# Patient Record
Sex: MALE | Race: WHITE | NOT HISPANIC OR LATINO
[De-identification: names, ages, dates, MRNs, and addresses within clinical notes are randomized per-mention and may not be internally consistent; named-entity substitution may affect disease eponyms.]

---

## 2017-01-08 ENCOUNTER — PRIOR ORIGINAL RECORDS (OUTPATIENT)
Dept: OTHER | Age: 57
End: 2017-01-08

## 2017-01-08 ENCOUNTER — APPOINTMENT (OUTPATIENT)
Dept: GENERAL RADIOLOGY | Facility: HOSPITAL | Age: 57
End: 2017-01-08
Attending: EMERGENCY MEDICINE
Payer: COMMERCIAL

## 2017-01-08 ENCOUNTER — APPOINTMENT (OUTPATIENT)
Dept: CT IMAGING | Facility: HOSPITAL | Age: 57
End: 2017-01-08
Attending: EMERGENCY MEDICINE
Payer: COMMERCIAL

## 2017-01-08 ENCOUNTER — HOSPITAL ENCOUNTER (EMERGENCY)
Facility: HOSPITAL | Age: 57
Discharge: HOME | End: 2017-01-08
Attending: EMERGENCY MEDICINE
Payer: COMMERCIAL

## 2017-01-08 VITALS
TEMPERATURE: 97 F | OXYGEN SATURATION: 100 % | SYSTOLIC BLOOD PRESSURE: 124 MMHG | WEIGHT: 281 LBS | BODY MASS INDEX: 39.34 KG/M2 | HEART RATE: 72 BPM | DIASTOLIC BLOOD PRESSURE: 80 MMHG | HEIGHT: 71 IN | RESPIRATION RATE: 18 BRPM

## 2017-01-08 DIAGNOSIS — S22.42XA CLOSED FRACTURE OF MULTIPLE RIBS OF LEFT SIDE, INITIAL ENCOUNTER: Primary | ICD-10-CM

## 2017-01-08 LAB
ALBUMIN SERPL-MCNC: 3.8 G/DL (ref 3.5–4.8)
ALP LIVER SERPL-CCNC: 95 U/L (ref 45–117)
ALT SERPL-CCNC: 34 U/L (ref 17–63)
APTT PPP: 36.4 SECONDS (ref 25–34)
AST SERPL-CCNC: 29 U/L (ref 15–41)
BASOPHILS # BLD AUTO: 0.03 X10(3) UL (ref 0–0.1)
BASOPHILS NFR BLD AUTO: 0.5 %
BILIRUB SERPL-MCNC: 1.1 MG/DL (ref 0.1–2)
BUN BLD-MCNC: 15 MG/DL (ref 8–20)
CALCIUM BLD-MCNC: 8.4 MG/DL (ref 8.3–10.3)
CHLORIDE: 104 MMOL/L (ref 101–111)
CO2: 25 MMOL/L (ref 22–32)
CREAT BLD-MCNC: 0.98 MG/DL (ref 0.7–1.3)
EOSINOPHIL # BLD AUTO: 0.18 X10(3) UL (ref 0–0.3)
EOSINOPHIL NFR BLD AUTO: 2.7 %
ERYTHROCYTE [DISTWIDTH] IN BLOOD BY AUTOMATED COUNT: 11.7 % (ref 11.5–16)
GLUCOSE BLD-MCNC: 202 MG/DL (ref 70–99)
HCT VFR BLD AUTO: 40.9 % (ref 37–53)
HGB BLD-MCNC: 14.3 G/DL (ref 13–17)
IMMATURE GRANULOCYTE COUNT: 0.02 X10(3) UL (ref 0–1)
IMMATURE GRANULOCYTE RATIO %: 0.3 %
INR BLD: 2.03 (ref 0.89–1.12)
LIPASE: 70 U/L (ref 73–393)
LYMPHOCYTES # BLD AUTO: 1.44 X10(3) UL (ref 0.9–4)
LYMPHOCYTES NFR BLD AUTO: 21.6 %
M PROTEIN MFR SERPL ELPH: 7 G/DL (ref 6.1–8.3)
MCH RBC QN AUTO: 31.7 PG (ref 27–33.2)
MCHC RBC AUTO-ENTMCNC: 35 G/DL (ref 31–37)
MCV RBC AUTO: 90.7 FL (ref 80–99)
MONOCYTES # BLD AUTO: 0.59 X10(3) UL (ref 0.1–0.6)
MONOCYTES NFR BLD AUTO: 8.9 %
NEUTROPHIL ABS PRELIM: 4.4 X10 (3) UL (ref 1.3–6.7)
NEUTROPHILS # BLD AUTO: 4.4 X10(3) UL (ref 1.3–6.7)
NEUTROPHILS NFR BLD AUTO: 66 %
PLATELET # BLD AUTO: 218 10(3)UL (ref 150–450)
POTASSIUM SERPL-SCNC: 3.6 MMOL/L (ref 3.6–5.1)
PSA SERPL DL<=0.01 NG/ML-MCNC: 23.7 SECONDS (ref 12.3–14.8)
RBC # BLD AUTO: 4.51 X10(6)UL (ref 4.3–5.7)
RED CELL DISTRIBUTION WIDTH-SD: 38.3 FL (ref 35.1–46.3)
SODIUM SERPL-SCNC: 136 MMOL/L (ref 136–144)
WBC # BLD AUTO: 6.7 X10(3) UL (ref 4–13)

## 2017-01-08 PROCEDURE — 74176 CT ABD & PELVIS W/O CONTRAST: CPT

## 2017-01-08 PROCEDURE — 36415 COLL VENOUS BLD VENIPUNCTURE: CPT

## 2017-01-08 PROCEDURE — 99284 EMERGENCY DEPT VISIT MOD MDM: CPT

## 2017-01-08 PROCEDURE — 85730 THROMBOPLASTIN TIME PARTIAL: CPT | Performed by: EMERGENCY MEDICINE

## 2017-01-08 PROCEDURE — 71101 X-RAY EXAM UNILAT RIBS/CHEST: CPT

## 2017-01-08 PROCEDURE — 85610 PROTHROMBIN TIME: CPT | Performed by: EMERGENCY MEDICINE

## 2017-01-08 PROCEDURE — 85025 COMPLETE CBC W/AUTO DIFF WBC: CPT | Performed by: EMERGENCY MEDICINE

## 2017-01-08 PROCEDURE — 80053 COMPREHEN METABOLIC PANEL: CPT | Performed by: EMERGENCY MEDICINE

## 2017-01-08 PROCEDURE — 83690 ASSAY OF LIPASE: CPT | Performed by: EMERGENCY MEDICINE

## 2017-01-08 RX ORDER — DIAZEPAM 5 MG/1
5 TABLET ORAL 3 TIMES DAILY PRN
Qty: 20 TABLET | Refills: 0 | Status: SHIPPED | OUTPATIENT
Start: 2017-01-08 | End: 2017-01-18

## 2017-01-08 RX ORDER — HYDROCODONE BITARTRATE AND ACETAMINOPHEN 5; 325 MG/1; MG/1
1-2 TABLET ORAL EVERY 4 HOURS PRN
Qty: 20 TABLET | Refills: 0 | Status: SHIPPED | OUTPATIENT
Start: 2017-01-08 | End: 2017-03-14 | Stop reason: ALTCHOICE

## 2017-01-08 NOTE — ED PROVIDER NOTES
Patient Seen in: [facility] Emergency Department    History   Patient presents with:  Trauma (cardiovascular, musculoskeletal)    Stated Complaint: RIB PAIN    HPI    63-year-old male presents to the emergency department with complaints to pain to t Attack Maternal Grandfather    • Pulmonary Disease Maternal Grandmother      pneumonia   • Cancer Mother      uterine   • Heart Disease Paternal Grandfather    • Heart Disease Paternal Grandmother          Smoking Status: Never Smoker tenderness. Lymphadenopathy:     He has no cervical adenopathy. Neurological: He is alert and oriented to person, place, and time. No cranial nerve deficit. He exhibits normal muscle tone. Skin: Skin is warm and dry.    Psychiatric: He has a normal mo exam.  We discussed care and treatment of fracture is being on Coumadin.   We also discussed that taking Norco and Valium at the same time can be very dangerous that I recommended that he separate them by at least an hour or 2 so that he does not become ove

## 2017-01-08 NOTE — ED INITIAL ASSESSMENT (HPI)
Pt fell while playing hockey yesterday. Pt fell onto chest.  No LOC. Pt completed the game. Pt woke this morning with left rib pain. Pt denies taking any pain medications this morning.

## 2017-01-11 ENCOUNTER — TELEPHONE (OUTPATIENT)
Dept: FAMILY MEDICINE CLINIC | Facility: CLINIC | Age: 57
End: 2017-01-11

## 2017-01-11 NOTE — TELEPHONE ENCOUNTER
Closed fracture of multiple ribs of left side, initial encounter  (primary encounter diagnosis) c/o pain with movement not DB doing well made appointment for 1/19/2017 with Dr Mis Mesa

## 2017-01-11 NOTE — TELEPHONE ENCOUNTER
Progress Notes by Favian Lilly PA-C at 1/8/2017 11:21 AM        Author: Favian Lilly PA-C Author Type: Physician Assistant Filed: 1/8/2017  1:16 PM       Note Status: Signed Cosign: Cosign Not Required Note Time: 1/8/2017 11:21 AM       :

## 2017-01-18 ENCOUNTER — PRIOR ORIGINAL RECORDS (OUTPATIENT)
Dept: OTHER | Age: 57
End: 2017-01-18

## 2017-01-19 ENCOUNTER — OFFICE VISIT (OUTPATIENT)
Dept: FAMILY MEDICINE CLINIC | Facility: CLINIC | Age: 57
End: 2017-01-19

## 2017-01-19 VITALS
SYSTOLIC BLOOD PRESSURE: 110 MMHG | HEIGHT: 71 IN | HEART RATE: 60 BPM | WEIGHT: 280.38 LBS | DIASTOLIC BLOOD PRESSURE: 60 MMHG | TEMPERATURE: 98 F | BODY MASS INDEX: 39.25 KG/M2 | RESPIRATION RATE: 16 BRPM

## 2017-01-19 DIAGNOSIS — S22.42XA CLOSED FRACTURE OF MULTIPLE RIBS OF LEFT SIDE, INITIAL ENCOUNTER: ICD-10-CM

## 2017-01-19 DIAGNOSIS — J01.00 ACUTE NON-RECURRENT MAXILLARY SINUSITIS: Primary | ICD-10-CM

## 2017-01-19 PROCEDURE — 99213 OFFICE O/P EST LOW 20 MIN: CPT | Performed by: FAMILY MEDICINE

## 2017-01-19 RX ORDER — AMOXICILLIN AND CLAVULANATE POTASSIUM 875; 125 MG/1; MG/1
1 TABLET, FILM COATED ORAL 2 TIMES DAILY
Qty: 20 TABLET | Refills: 0 | Status: SHIPPED | OUTPATIENT
Start: 2017-01-19 | End: 2017-01-29

## 2017-01-19 RX ORDER — CODEINE PHOSPHATE AND GUAIFENESIN 10; 100 MG/5ML; MG/5ML
5 SOLUTION ORAL EVERY 6 HOURS PRN
Qty: 120 ML | Refills: 0 | Status: SHIPPED | OUTPATIENT
Start: 2017-01-19 | End: 2017-01-29

## 2017-01-19 NOTE — PROGRESS NOTES
Patient presents with:  ER F/U: seen on 1/8/17 Dx with Rib Frx on left side #6,#7  Cough: productive with green sputum- has been around for a while now     HPI:   Inna Ariza is a 64year old male who presents to the office for productive cough.   Been thehealing. PRN meds. - Amoxicillin-Pot Clavulanate 875-125 MG Oral Tab; Take 1 tablet by mouth 2 (two) times daily. Dispense: 20 tablet;  Refill: 0          Eric Machado M.D.   EMG 3  01/19/2017

## 2017-01-31 ENCOUNTER — OFFICE VISIT (OUTPATIENT)
Dept: FAMILY MEDICINE CLINIC | Facility: CLINIC | Age: 57
End: 2017-01-31

## 2017-01-31 VITALS
SYSTOLIC BLOOD PRESSURE: 110 MMHG | WEIGHT: 281.19 LBS | HEIGHT: 70.8 IN | DIASTOLIC BLOOD PRESSURE: 64 MMHG | BODY MASS INDEX: 39.37 KG/M2 | RESPIRATION RATE: 14 BRPM | HEART RATE: 64 BPM

## 2017-01-31 DIAGNOSIS — R09.82 PND (POST-NASAL DRIP): Primary | ICD-10-CM

## 2017-01-31 PROCEDURE — 99213 OFFICE O/P EST LOW 20 MIN: CPT | Performed by: FAMILY MEDICINE

## 2017-01-31 NOTE — PROGRESS NOTES
Patient presents with:  Cough: finished Abx - still coughing - still productive-clear- feels alot of sinus mucus also being produced     HPI:   Brittany Abraham is a 64year old male who presents to the office for continued symptoms.   Mother in law recently

## 2017-03-14 ENCOUNTER — OFFICE VISIT (OUTPATIENT)
Dept: FAMILY MEDICINE CLINIC | Facility: CLINIC | Age: 57
End: 2017-03-14

## 2017-03-14 VITALS
RESPIRATION RATE: 16 BRPM | SYSTOLIC BLOOD PRESSURE: 114 MMHG | TEMPERATURE: 98 F | HEIGHT: 70.75 IN | WEIGHT: 277 LBS | HEART RATE: 70 BPM | BODY MASS INDEX: 38.78 KG/M2 | DIASTOLIC BLOOD PRESSURE: 70 MMHG

## 2017-03-14 DIAGNOSIS — J45.20 MILD INTERMITTENT ASTHMA WITHOUT COMPLICATION: ICD-10-CM

## 2017-03-14 DIAGNOSIS — I48.20 CHRONIC ATRIAL FIBRILLATION (HCC): ICD-10-CM

## 2017-03-14 DIAGNOSIS — E11.65 UNCONTROLLED TYPE 2 DIABETES MELLITUS WITH HYPERGLYCEMIA, WITHOUT LONG-TERM CURRENT USE OF INSULIN (HCC): ICD-10-CM

## 2017-03-14 DIAGNOSIS — G47.33 OSA ON CPAP: ICD-10-CM

## 2017-03-14 DIAGNOSIS — Z99.89 OSA ON CPAP: ICD-10-CM

## 2017-03-14 DIAGNOSIS — R09.82 POST-NASAL DRIP: ICD-10-CM

## 2017-03-14 DIAGNOSIS — Z12.11 COLON CANCER SCREENING: ICD-10-CM

## 2017-03-14 DIAGNOSIS — Z12.5 PROSTATE CANCER SCREENING: ICD-10-CM

## 2017-03-14 DIAGNOSIS — Z00.00 ANNUAL PHYSICAL EXAM: Primary | ICD-10-CM

## 2017-03-14 DIAGNOSIS — I10 ESSENTIAL HYPERTENSION: ICD-10-CM

## 2017-03-14 DIAGNOSIS — E78.5 HYPERLIPIDEMIA LDL GOAL <100: ICD-10-CM

## 2017-03-14 PROCEDURE — 99396 PREV VISIT EST AGE 40-64: CPT | Performed by: FAMILY MEDICINE

## 2017-03-14 NOTE — PROGRESS NOTES
Patient presents with:  Physical     HPI:   Yaritza Hoffman is a 64year old male with PMH a-fib, DM2, HTN, HLD, ANTOINETTE, asthma who presents for a complete physical exam.     Last colonoscopy:  Never had.     Last PSA:  Urination 4 times a night, new the last 07:43 AM      No results found for: PSA        Current Outpatient Prescriptions:  MetFORMIN HCl  MG Oral Tablet 24 Hr Take 1 tablet (500 mg total) by mouth 2 (two) times daily with meals.  Disp: 60 tablet Rfl: 11   Warfarin Sodium (COUMADIN) 5 MG Oral General appearance: alert, appears stated age and cooperative. Obese. Eyes: conjunctivae/corneas clear. PERRL, EOM's intact. Ears: normal TM's and external ear canals both ears  Nose: Nares normal. Septum midline.  Mucosa normal. No drainage or sinu Essential hypertension  BP at goal.  No changes to meds. - Comp Metabolic Panel [E]; Future    9. Hyperlipidemia LDL goal <100  Lipids not at goal last check. Due for check. - Comp Metabolic Panel [E]; Future  - Lipid Panel [E]; Future    10.  Prostate c

## 2017-03-15 ENCOUNTER — TELEPHONE (OUTPATIENT)
Dept: FAMILY MEDICINE CLINIC | Facility: CLINIC | Age: 57
End: 2017-03-15

## 2017-03-15 DIAGNOSIS — J30.2 SEASONAL ALLERGIC RHINITIS, UNSPECIFIED ALLERGIC RHINITIS TRIGGER: Primary | ICD-10-CM

## 2017-03-15 RX ORDER — AZELASTINE 1 MG/ML
1 SPRAY, METERED NASAL 2 TIMES DAILY
Qty: 1 BOTTLE | Refills: 2 | Status: SHIPPED | OUTPATIENT
Start: 2017-03-15 | End: 2020-09-09

## 2017-03-15 RX ORDER — FLUTICASONE PROPIONATE 50 MCG
2 SPRAY, SUSPENSION (ML) NASAL DAILY
Qty: 1 BOTTLE | Refills: 2 | Status: SHIPPED | OUTPATIENT
Start: 2017-03-15 | End: 2018-03-10

## 2017-03-15 NOTE — TELEPHONE ENCOUNTER
Veri mist has been discontinued, can you please call in another medication? He suggested flonase. He was seen by Dr. Madi West 3/14/17.

## 2017-03-15 NOTE — TELEPHONE ENCOUNTER
Babak notified that [facility] has ordered 2 nasal sprays to take the place of Bienvenido gibbs understanding.

## 2017-03-15 NOTE — TELEPHONE ENCOUNTER
Please advise as to what you would like to prescribe    Routed to [facility] [place]

## 2017-04-17 ENCOUNTER — HOSPITAL ENCOUNTER (OUTPATIENT)
Dept: LAB | Facility: HOSPITAL | Age: 57
Discharge: HOME | End: 2017-04-17
Attending: INTERNAL MEDICINE
Payer: COMMERCIAL

## 2017-04-17 DIAGNOSIS — I48.91 ATRIAL FIBRILLATION (HCC): ICD-10-CM

## 2017-04-17 PROCEDURE — 85610 PROTHROMBIN TIME: CPT

## 2017-04-26 ENCOUNTER — HOSPITAL ENCOUNTER (OUTPATIENT)
Dept: LAB | Facility: HOSPITAL | Age: 57
Discharge: HOME | End: 2017-04-26
Attending: INTERNAL MEDICINE
Payer: COMMERCIAL

## 2017-04-26 ENCOUNTER — PRIOR ORIGINAL RECORDS (OUTPATIENT)
Dept: OTHER | Age: 57
End: 2017-04-26

## 2017-04-26 DIAGNOSIS — I48.91 ATRIAL FIBRILLATION (HCC): ICD-10-CM

## 2017-04-26 LAB
ALBUMIN: 3.8 G/DL
BILIRUBIN TOTAL: 1.1 MG/DL
BUN: 15 MG/DL
CALCIUM: 8.4 MG/DL
CHLORIDE: 104 MEQ/L
CREATININE, SERUM: 0.98 MG/DL
GLUCOSE: 202 MG/DL
HEMATOCRIT: 40.9 %
HEMOGLOBIN: 14.3 G/DL
PLATELETS: 218 K/UL
POTASSIUM, SERUM: 3.6 MEQ/L
PROTEIN, TOTAL: 7 G/DL
RED BLOOD COUNT: 4.51 X 10-6/U
SGOT (AST): 29 IU/L
SGPT (ALT): 34 IU/L
SODIUM: 136 MEQ/L
WHITE BLOOD COUNT: 6.7 X 10-3/U

## 2017-04-26 PROCEDURE — 85610 PROTHROMBIN TIME: CPT

## 2017-06-13 ENCOUNTER — HOSPITAL ENCOUNTER (OUTPATIENT)
Dept: LAB | Facility: HOSPITAL | Age: 57
Discharge: HOME | End: 2017-06-13
Attending: INTERNAL MEDICINE
Payer: COMMERCIAL

## 2017-06-13 ENCOUNTER — PRIOR ORIGINAL RECORDS (OUTPATIENT)
Dept: OTHER | Age: 57
End: 2017-06-13

## 2017-06-13 DIAGNOSIS — I48.91 ATRIAL FIBRILLATION (HCC): ICD-10-CM

## 2017-06-13 PROCEDURE — 80061 LIPID PANEL: CPT | Performed by: INTERNAL MEDICINE

## 2017-06-13 PROCEDURE — 36415 COLL VENOUS BLD VENIPUNCTURE: CPT | Performed by: INTERNAL MEDICINE

## 2017-06-13 PROCEDURE — 83036 HEMOGLOBIN GLYCOSYLATED A1C: CPT | Performed by: INTERNAL MEDICINE

## 2017-06-13 PROCEDURE — 85610 PROTHROMBIN TIME: CPT

## 2017-06-13 PROCEDURE — 80053 COMPREHEN METABOLIC PANEL: CPT | Performed by: INTERNAL MEDICINE

## 2017-07-21 DIAGNOSIS — E11.65 DIABETES MELLITUS TYPE II, UNCONTROLLED (HCC): ICD-10-CM

## 2017-07-24 DIAGNOSIS — E11.65 DIABETES MELLITUS TYPE II, UNCONTROLLED (HCC): ICD-10-CM

## 2017-07-24 RX ORDER — METFORMIN HYDROCHLORIDE 500 MG/1
TABLET, EXTENDED RELEASE ORAL
Qty: 60 TABLET | Refills: 5 | Status: SHIPPED | OUTPATIENT
Start: 2017-07-24 | End: 2018-03-14

## 2017-07-24 RX ORDER — METFORMIN HYDROCHLORIDE 500 MG/1
TABLET, EXTENDED RELEASE ORAL
Qty: 60 TABLET | Refills: 0 | Status: SHIPPED | OUTPATIENT
Start: 2017-07-24 | End: 2018-03-14

## 2017-07-24 NOTE — TELEPHONE ENCOUNTER
Incoming request for Metformin. LOV 3/14/2017 and last labs done 6/13/2017. No future appointments. A1C on 6/13/2017 ws 9.8. Medication did not pass protocol. Will you authorize?   Routed to Dr. Gretchen Cox

## 2017-08-19 ENCOUNTER — HOSPITAL ENCOUNTER (OUTPATIENT)
Dept: LAB | Facility: HOSPITAL | Age: 57
Discharge: HOME | End: 2017-08-19
Attending: INTERNAL MEDICINE
Payer: COMMERCIAL

## 2017-08-19 DIAGNOSIS — I48.91 ATRIAL FIBRILLATION (HCC): ICD-10-CM

## 2017-08-19 LAB — POC INR: 2.7 (ref 0.8–1.3)

## 2017-08-19 PROCEDURE — 85610 PROTHROMBIN TIME: CPT

## 2017-08-24 DIAGNOSIS — E11.65 DIABETES MELLITUS TYPE II, UNCONTROLLED (HCC): ICD-10-CM

## 2017-08-25 RX ORDER — METFORMIN HYDROCHLORIDE 500 MG/1
TABLET, EXTENDED RELEASE ORAL
Qty: 60 TABLET | Refills: 5 | Status: SHIPPED | OUTPATIENT
Start: 2017-08-25 | End: 2018-02-14

## 2017-09-08 ENCOUNTER — PRIOR ORIGINAL RECORDS (OUTPATIENT)
Dept: OTHER | Age: 57
End: 2017-09-08

## 2017-11-30 ENCOUNTER — HOSPITAL ENCOUNTER (OUTPATIENT)
Dept: LAB | Facility: HOSPITAL | Age: 57
Discharge: HOME | End: 2017-11-30
Attending: INTERNAL MEDICINE
Payer: COMMERCIAL

## 2017-11-30 PROCEDURE — 85610 PROTHROMBIN TIME: CPT

## 2017-12-13 ENCOUNTER — PRIOR ORIGINAL RECORDS (OUTPATIENT)
Dept: OTHER | Age: 57
End: 2017-12-13

## 2017-12-13 LAB
ALKALINE PHOSPHATATE(ALK PHOS): 86 IU/L
BILIRUBIN TOTAL: 0.8 MG/DL
BUN: 15 MG/DL
CALCIUM: 8.4 MG/DL
CHLORIDE: 105 MEQ/L
CHOLESTEROL, TOTAL: 213 MG/DL
CREATININE, SERUM: 0.87 MG/DL
GLUCOSE: 229 MG/DL
HDL CHOLESTEROL: 32 MG/DL
HEMOGLOBIN A1C: 9.8 %
LDL CHOLESTEROL: 115 MG/DL
POTASSIUM, SERUM: 4.3 MEQ/L
PROTEIN, TOTAL: 7.1 G/DL
SGOT (AST): 24 IU/L
SGPT (ALT): 31 IU/L
SODIUM: 138 MEQ/L
TRIGLYCERIDES: 331 MG/DL

## 2018-02-14 ENCOUNTER — TELEPHONE (OUTPATIENT)
Dept: FAMILY MEDICINE CLINIC | Facility: CLINIC | Age: 58
End: 2018-02-14

## 2018-02-14 DIAGNOSIS — Z12.5 PROSTATE CANCER SCREENING: ICD-10-CM

## 2018-02-14 DIAGNOSIS — I10 ESSENTIAL HYPERTENSION: ICD-10-CM

## 2018-02-14 DIAGNOSIS — E78.5 HYPERLIPIDEMIA LDL GOAL <100: ICD-10-CM

## 2018-02-14 DIAGNOSIS — E11.65 DIABETES MELLITUS TYPE II, UNCONTROLLED (HCC): Primary | ICD-10-CM

## 2018-02-15 RX ORDER — METFORMIN HYDROCHLORIDE 500 MG/1
TABLET, EXTENDED RELEASE ORAL
Qty: 60 TABLET | Refills: 0 | Status: SHIPPED | OUTPATIENT
Start: 2018-02-15 | End: 2018-03-14

## 2018-02-15 NOTE — TELEPHONE ENCOUNTER
Please call Pt and assist with scheduling appt for Physical/Diabetes. LOV 03/14/17. Routed to [facility].

## 2018-02-16 ENCOUNTER — TELEPHONE (OUTPATIENT)
Dept: FAMILY MEDICINE CLINIC | Facility: CLINIC | Age: 58
End: 2018-02-16

## 2018-02-16 NOTE — TELEPHONE ENCOUNTER
Spoke with patient and scheduled his physical with Dr. Latonia Greer for 3/14/18. Please call labs into [facility]. Thank you.

## 2018-02-16 NOTE — TELEPHONE ENCOUNTER
Pt coning for Physical 3/14/18. Is there any labs you want Pt to complete prior to appt  ? Routed to Dr Rosas Kelley.

## 2018-03-13 ENCOUNTER — HOSPITAL ENCOUNTER (OUTPATIENT)
Dept: LAB | Facility: HOSPITAL | Age: 58
Discharge: HOME | End: 2018-03-13
Attending: FAMILY MEDICINE
Payer: COMMERCIAL

## 2018-03-13 ENCOUNTER — HOSPITAL ENCOUNTER (OUTPATIENT)
Dept: LAB | Facility: HOSPITAL | Age: 58
Discharge: HOME | End: 2018-03-13
Attending: INTERNAL MEDICINE
Payer: COMMERCIAL

## 2018-03-13 DIAGNOSIS — Z12.5 PROSTATE CANCER SCREENING: ICD-10-CM

## 2018-03-13 DIAGNOSIS — I10 ESSENTIAL HYPERTENSION: ICD-10-CM

## 2018-03-13 DIAGNOSIS — I48.91 ATRIAL FIBRILLATION (HCC): ICD-10-CM

## 2018-03-13 DIAGNOSIS — E11.65 DIABETES MELLITUS TYPE II, UNCONTROLLED (HCC): ICD-10-CM

## 2018-03-13 DIAGNOSIS — E78.5 HYPERLIPIDEMIA LDL GOAL <100: ICD-10-CM

## 2018-03-13 LAB
ALBUMIN SERPL-MCNC: 3.5 G/DL (ref 3.5–4.8)
ALP LIVER SERPL-CCNC: 87 U/L (ref 45–117)
ALT SERPL-CCNC: 25 U/L (ref 17–63)
AST SERPL-CCNC: 19 U/L (ref 15–41)
BILIRUB SERPL-MCNC: 0.7 MG/DL (ref 0.1–2)
BUN BLD-MCNC: 11 MG/DL (ref 8–20)
CALCIUM BLD-MCNC: 8.4 MG/DL (ref 8.3–10.3)
CHLORIDE: 107 MMOL/L (ref 101–111)
CHOLEST SMN-MCNC: 167 MG/DL (ref ?–200)
CO2: 26 MMOL/L (ref 22–32)
COMPLEXED PSA SERPL-MCNC: 1.56 NG/ML (ref 0.01–4)
CREAT BLD-MCNC: 0.8 MG/DL (ref 0.7–1.3)
CREAT UR-SCNC: 266 MG/DL
EST. AVERAGE GLUCOSE BLD GHB EST-MCNC: 252 MG/DL (ref 68–126)
GLUCOSE BLD-MCNC: 225 MG/DL (ref 70–99)
HBA1C MFR BLD HPLC: 10.4 % (ref ?–5.7)
HDLC SERPL-MCNC: 31 MG/DL (ref 45–?)
HDLC SERPL: 5.39 {RATIO} (ref ?–4.97)
LDLC SERPL CALC-MCNC: 81 MG/DL (ref ?–130)
M PROTEIN MFR SERPL ELPH: 7.1 G/DL (ref 6.1–8.3)
MICROALBUMIN UR-MCNC: 3.07 MG/DL
MICROALBUMIN/CREAT 24H UR-RTO: 11.5 UG/MG (ref ?–30)
NONHDLC SERPL-MCNC: 136 MG/DL (ref ?–130)
POC INR: 2.6 (ref 0.8–1.3)
POTASSIUM SERPL-SCNC: 3.9 MMOL/L (ref 3.6–5.1)
SODIUM SERPL-SCNC: 141 MMOL/L (ref 136–144)
TRIGL SERPL-MCNC: 274 MG/DL (ref ?–150)
VLDLC SERPL CALC-MCNC: 55 MG/DL (ref 5–40)

## 2018-03-13 PROCEDURE — 85610 PROTHROMBIN TIME: CPT

## 2018-03-13 PROCEDURE — 83036 HEMOGLOBIN GLYCOSYLATED A1C: CPT

## 2018-03-13 PROCEDURE — 80061 LIPID PANEL: CPT

## 2018-03-13 PROCEDURE — 82570 ASSAY OF URINE CREATININE: CPT

## 2018-03-13 PROCEDURE — 36415 COLL VENOUS BLD VENIPUNCTURE: CPT

## 2018-03-13 PROCEDURE — 82043 UR ALBUMIN QUANTITATIVE: CPT

## 2018-03-13 PROCEDURE — 80053 COMPREHEN METABOLIC PANEL: CPT

## 2018-03-14 ENCOUNTER — OFFICE VISIT (OUTPATIENT)
Dept: FAMILY MEDICINE CLINIC | Facility: CLINIC | Age: 58
End: 2018-03-14

## 2018-03-14 VITALS
SYSTOLIC BLOOD PRESSURE: 128 MMHG | RESPIRATION RATE: 14 BRPM | DIASTOLIC BLOOD PRESSURE: 80 MMHG | BODY MASS INDEX: 38.27 KG/M2 | WEIGHT: 273.38 LBS | HEART RATE: 80 BPM | TEMPERATURE: 99 F | HEIGHT: 71 IN

## 2018-03-14 DIAGNOSIS — G47.33 OSA ON CPAP: ICD-10-CM

## 2018-03-14 DIAGNOSIS — Z00.00 ANNUAL PHYSICAL EXAM: Primary | ICD-10-CM

## 2018-03-14 DIAGNOSIS — E78.5 HYPERLIPIDEMIA LDL GOAL <100: ICD-10-CM

## 2018-03-14 DIAGNOSIS — I10 ESSENTIAL HYPERTENSION: ICD-10-CM

## 2018-03-14 DIAGNOSIS — J45.20 MILD INTERMITTENT ASTHMA WITHOUT COMPLICATION: ICD-10-CM

## 2018-03-14 DIAGNOSIS — E66.01 SEVERE OBESITY (BMI 35.0-39.9): ICD-10-CM

## 2018-03-14 DIAGNOSIS — Z99.89 OSA ON CPAP: ICD-10-CM

## 2018-03-14 DIAGNOSIS — I48.20 CHRONIC ATRIAL FIBRILLATION (HCC): ICD-10-CM

## 2018-03-14 DIAGNOSIS — Z12.11 COLON CANCER SCREENING: ICD-10-CM

## 2018-03-14 DIAGNOSIS — E11.65 UNCONTROLLED TYPE 2 DIABETES MELLITUS WITH HYPERGLYCEMIA, WITHOUT LONG-TERM CURRENT USE OF INSULIN (HCC): ICD-10-CM

## 2018-03-14 DIAGNOSIS — J01.00 ACUTE NON-RECURRENT MAXILLARY SINUSITIS: ICD-10-CM

## 2018-03-14 PROCEDURE — 99396 PREV VISIT EST AGE 40-64: CPT | Performed by: FAMILY MEDICINE

## 2018-03-14 RX ORDER — ALBUTEROL SULFATE 90 UG/1
2 AEROSOL, METERED RESPIRATORY (INHALATION) EVERY 4 HOURS PRN
Qty: 1 INHALER | Refills: 2 | Status: SHIPPED | OUTPATIENT
Start: 2018-03-14

## 2018-03-14 RX ORDER — METFORMIN HYDROCHLORIDE 500 MG/1
1000 TABLET, EXTENDED RELEASE ORAL 2 TIMES DAILY WITH MEALS
Qty: 120 TABLET | Refills: 5 | Status: SHIPPED | OUTPATIENT
Start: 2018-03-14 | End: 2018-12-15

## 2018-03-14 RX ORDER — AMOXICILLIN AND CLAVULANATE POTASSIUM 875; 125 MG/1; MG/1
1 TABLET, FILM COATED ORAL 2 TIMES DAILY
Qty: 14 TABLET | Refills: 0 | Status: SHIPPED | OUTPATIENT
Start: 2018-03-14 | End: 2018-03-21

## 2018-03-14 RX ORDER — GLIPIZIDE 10 MG/1
5 TABLET ORAL
Qty: 30 TABLET | Refills: 5 | Status: SHIPPED | OUTPATIENT
Start: 2018-03-14 | End: 2018-09-30

## 2018-03-14 NOTE — PROGRESS NOTES
Patient presents with:  Physical  URI: SINUS CONGESTION WITH GREEN MUCUS- FOR TEN DAYS     HPI:   Brittany Abraham is a 62year old male who presents for a complete physical exam.     Last colonoscopy:  Never had. Last PSA:  Normal PSA blood testing.    I (90 Base) MCG/ACT Inhalation Aero Soln Inhale 2 puffs into the lungs every 4 (four) hours as needed for Wheezing. Disp: 1 Inhaler Rfl: 2   MetFORMIN HCl  MG Oral Tablet 24 Hr Take 2 tablets (1,000 mg total) by mouth 2 (two) times daily with meals.  Bushra mAador in summer. Idle in winter. Diet: lower portion sizes. REVIEW OF SYSTEMS:     All systems reviewed, negative other than noted above.     EXAM:   /80   Pulse 80   Temp 99.1 °F (37.3 °C) (Oral)   Resp 14   Ht 71\"   Wt 273 lb 6.4 oz   BMI 38.13 k 1,000mg ER BID  Add glipizide 5mg BID  - MetFORMIN HCl  MG Oral Tablet 24 Hr; Take 2 tablets (1,000 mg total) by mouth 2 (two) times daily with meals. Dispense: 120 tablet; Refill: 5  - glipiZIDE 10 MG Oral Tab;  Take 0.5 tablets (5 mg total) by mout

## 2018-03-23 ENCOUNTER — TELEPHONE (OUTPATIENT)
Dept: FAMILY MEDICINE CLINIC | Facility: CLINIC | Age: 58
End: 2018-03-23

## 2018-03-23 RX ORDER — AMOXICILLIN AND CLAVULANATE POTASSIUM 875; 125 MG/1; MG/1
1 TABLET, FILM COATED ORAL 2 TIMES DAILY
Qty: 10 TABLET | Refills: 0 | Status: SHIPPED | OUTPATIENT
Start: 2018-03-23 | End: 2018-03-28

## 2018-03-23 NOTE — TELEPHONE ENCOUNTER
Pt states was feeling good, finished antibiotic 2 days ago. Wake with morning with slight cough, throat irritation and voice is hoarse.  Pt concerned about taking any OTC medication due to his health and meds  Please advise

## 2018-03-23 NOTE — TELEPHONE ENCOUNTER
Patient was seen 03/14/18 for sinus and congestion given a prescription for Amoxicillin which he finished 2 days ago, patient states he feels everything coming back again and not sure if he needs to be seen again or if he can get another prescription

## 2018-04-19 ENCOUNTER — TELEPHONE (OUTPATIENT)
Dept: FAMILY MEDICINE CLINIC | Facility: CLINIC | Age: 58
End: 2018-04-19

## 2018-06-06 ENCOUNTER — PRIOR ORIGINAL RECORDS (OUTPATIENT)
Dept: OTHER | Age: 58
End: 2018-06-06

## 2018-06-06 ENCOUNTER — HOSPITAL ENCOUNTER (OUTPATIENT)
Dept: LAB | Facility: HOSPITAL | Age: 58
Discharge: HOME | End: 2018-06-06
Attending: INTERNAL MEDICINE
Payer: COMMERCIAL

## 2018-06-06 DIAGNOSIS — I48.91 ATRIAL FIBRILLATION (HCC): ICD-10-CM

## 2018-06-06 PROCEDURE — 80053 COMPREHEN METABOLIC PANEL: CPT | Performed by: INTERNAL MEDICINE

## 2018-06-06 PROCEDURE — 36415 COLL VENOUS BLD VENIPUNCTURE: CPT | Performed by: INTERNAL MEDICINE

## 2018-06-06 PROCEDURE — 83036 HEMOGLOBIN GLYCOSYLATED A1C: CPT | Performed by: INTERNAL MEDICINE

## 2018-06-06 PROCEDURE — 85610 PROTHROMBIN TIME: CPT

## 2018-06-06 PROCEDURE — 80061 LIPID PANEL: CPT | Performed by: INTERNAL MEDICINE

## 2018-06-07 LAB
ALKALINE PHOSPHATATE(ALK PHOS): 71 IU/L
BILIRUBIN TOTAL: 0.8 MG/DL
BUN: 15 MG/DL
CALCIUM: 8.5 MG/DL
CHLORIDE: 108 MEQ/L
CHOLESTEROL, TOTAL: 183 MG/DL
CREATININE, SERUM: 0.93 MG/DL
GLUCOSE: 125 MG/DL
HDL CHOLESTEROL: 31 MG/DL
HEMOGLOBIN A1C: 7.7 %
LDL CHOLESTEROL: 96 MG/DL
POTASSIUM, SERUM: 4.3 MEQ/L
PROTEIN, TOTAL: 7.2 G/DL
SGOT (AST): 23 IU/L
SGPT (ALT): 34 IU/L
SODIUM: 141 MEQ/L
TRIGLYCERIDES: 282 MG/DL

## 2018-06-13 ENCOUNTER — PRIOR ORIGINAL RECORDS (OUTPATIENT)
Dept: OTHER | Age: 58
End: 2018-06-13

## 2018-06-13 ENCOUNTER — MYAURORA ACCOUNT LINK (OUTPATIENT)
Dept: OTHER | Age: 58
End: 2018-06-13

## 2018-07-11 ENCOUNTER — OFFICE VISIT (OUTPATIENT)
Dept: FAMILY MEDICINE CLINIC | Facility: CLINIC | Age: 58
End: 2018-07-11

## 2018-07-11 ENCOUNTER — PRIOR ORIGINAL RECORDS (OUTPATIENT)
Dept: OTHER | Age: 58
End: 2018-07-11

## 2018-07-11 VITALS
HEART RATE: 72 BPM | TEMPERATURE: 99 F | WEIGHT: 278.63 LBS | SYSTOLIC BLOOD PRESSURE: 110 MMHG | HEIGHT: 71.6 IN | BODY MASS INDEX: 38.15 KG/M2 | DIASTOLIC BLOOD PRESSURE: 70 MMHG | RESPIRATION RATE: 14 BRPM

## 2018-07-11 DIAGNOSIS — I48.20 CHRONIC ATRIAL FIBRILLATION (HCC): ICD-10-CM

## 2018-07-11 DIAGNOSIS — M25.552 LEFT HIP PAIN: ICD-10-CM

## 2018-07-11 DIAGNOSIS — J45.20 MILD INTERMITTENT ASTHMA WITHOUT COMPLICATION: ICD-10-CM

## 2018-07-11 DIAGNOSIS — E11.65 UNCONTROLLED TYPE 2 DIABETES MELLITUS WITH HYPERGLYCEMIA, WITHOUT LONG-TERM CURRENT USE OF INSULIN (HCC): Primary | ICD-10-CM

## 2018-07-11 PROCEDURE — 99214 OFFICE O/P EST MOD 30 MIN: CPT | Performed by: FAMILY MEDICINE

## 2018-07-11 RX ORDER — TRAMADOL HYDROCHLORIDE 50 MG/1
50 TABLET ORAL 2 TIMES DAILY PRN
Qty: 20 TABLET | Refills: 0 | Status: SHIPPED | OUTPATIENT
Start: 2018-07-11 | End: 2019-10-29

## 2018-07-11 NOTE — PROGRESS NOTES
Patient presents with:  Diabetes     HPI:   Heddie Peabody is a 62year old male who presents for a diabetic visit. Typical blood sugar levels are better. A1C 7.7. Much improved from the 10 he had 3 months prior.   Watching carbs more, increasing exer 9.6 oz   BMI 38.21 kg/m²   Wt Readings from Last 6 Encounters:  07/11/18 : 278 lb 9.6 oz  06/18/18 : 276 lb  03/14/18 : 273 lb 6.4 oz  03/14/17 : 277 lb  01/31/17 : 281 lb 3.2 oz  01/19/17 : 280 lb 6.4 oz      General: alert, well appearing, and in no dist

## 2018-08-15 ENCOUNTER — MYAURORA ACCOUNT LINK (OUTPATIENT)
Dept: OTHER | Age: 58
End: 2018-08-15

## 2018-08-15 ENCOUNTER — PRIOR ORIGINAL RECORDS (OUTPATIENT)
Dept: OTHER | Age: 58
End: 2018-08-15

## 2018-08-30 ENCOUNTER — HOSPITAL ENCOUNTER (OUTPATIENT)
Dept: CV DIAGNOSTICS | Facility: HOSPITAL | Age: 58
Discharge: HOME | End: 2018-08-30
Attending: INTERNAL MEDICINE

## 2018-08-30 ENCOUNTER — HOSPITAL ENCOUNTER (OUTPATIENT)
Dept: LAB | Facility: HOSPITAL | Age: 58
Discharge: HOME | End: 2018-08-30
Attending: INTERNAL MEDICINE
Payer: COMMERCIAL

## 2018-08-30 ENCOUNTER — MYAURORA ACCOUNT LINK (OUTPATIENT)
Dept: OTHER | Age: 58
End: 2018-08-30

## 2018-08-30 DIAGNOSIS — I48.20 ATRIAL FIBRILLATION, CHRONIC (HCC): ICD-10-CM

## 2018-08-30 LAB
INR BLD: 2.73 (ref 0.9–1.1)
PSA SERPL DL<=0.01 NG/ML-MCNC: 29.8 SECONDS (ref 12.4–14.7)

## 2018-08-30 PROCEDURE — 36415 COLL VENOUS BLD VENIPUNCTURE: CPT | Performed by: INTERNAL MEDICINE

## 2018-08-30 PROCEDURE — 85610 PROTHROMBIN TIME: CPT | Performed by: INTERNAL MEDICINE

## 2018-09-04 ENCOUNTER — PRIOR ORIGINAL RECORDS (OUTPATIENT)
Dept: OTHER | Age: 58
End: 2018-09-04

## 2018-09-30 DIAGNOSIS — E11.65 UNCONTROLLED TYPE 2 DIABETES MELLITUS WITH HYPERGLYCEMIA, WITHOUT LONG-TERM CURRENT USE OF INSULIN (HCC): ICD-10-CM

## 2018-10-04 RX ORDER — GLIPIZIDE 10 MG/1
TABLET ORAL
Qty: 30 TABLET | Refills: 5 | Status: SHIPPED | OUTPATIENT
Start: 2018-10-04 | End: 2019-03-14

## 2018-12-15 DIAGNOSIS — E11.65 UNCONTROLLED TYPE 2 DIABETES MELLITUS WITH HYPERGLYCEMIA, WITHOUT LONG-TERM CURRENT USE OF INSULIN (HCC): ICD-10-CM

## 2018-12-15 RX ORDER — METFORMIN HYDROCHLORIDE 500 MG/1
TABLET, EXTENDED RELEASE ORAL
Qty: 120 TABLET | Refills: 5 | Status: SHIPPED | OUTPATIENT
Start: 2018-12-15 | End: 2019-03-14

## 2018-12-28 ENCOUNTER — PATIENT MESSAGE (OUTPATIENT)
Dept: FAMILY MEDICINE CLINIC | Facility: CLINIC | Age: 58
End: 2018-12-28

## 2018-12-28 NOTE — TELEPHONE ENCOUNTER
From: Leonarda Lopez  To: Devendra Tello MD  Sent: 12/28/2018 9:56 AM CST  Subject: Other    Hi Dr Mis Mesa. . A week ago I hit my left shin with a small mallet. The bruise is not getting bigger but continues to be sore and pulsates at times.     the more

## 2018-12-31 NOTE — PROGRESS NOTES
Patient presents with:  Contusion (musculoskeletal): Pt has a bruise on left shin x 1 wk after hitting shin with a hammer  Depression: Patients son passed away due to an overdose, pt also came in contact with sons syringe that struck right ring finger finger). 1. Grief reaction  So far, normal grief process. Will give benzo for PRN use. Warned of the SE of the med. Use as needed  Plan to see him again in about 10 days.   - ALPRAZolam 0.25 MG Oral Tab;  Take 1 tablet (0.25 mg total) by mouth every 6

## 2019-01-09 ENCOUNTER — OFFICE VISIT (OUTPATIENT)
Dept: FAMILY MEDICINE CLINIC | Facility: CLINIC | Age: 59
End: 2019-01-09
Payer: COMMERCIAL

## 2019-01-09 VITALS
RESPIRATION RATE: 16 BRPM | BODY MASS INDEX: 38.08 KG/M2 | WEIGHT: 266 LBS | HEART RATE: 96 BPM | SYSTOLIC BLOOD PRESSURE: 130 MMHG | DIASTOLIC BLOOD PRESSURE: 80 MMHG | HEIGHT: 70 IN | TEMPERATURE: 99 F

## 2019-01-09 DIAGNOSIS — E78.5 HYPERLIPIDEMIA LDL GOAL <100: ICD-10-CM

## 2019-01-09 DIAGNOSIS — F43.21 GRIEF REACTION: ICD-10-CM

## 2019-01-09 DIAGNOSIS — J45.20 MILD INTERMITTENT ASTHMA WITHOUT COMPLICATION: ICD-10-CM

## 2019-01-09 DIAGNOSIS — I10 ESSENTIAL HYPERTENSION: ICD-10-CM

## 2019-01-09 DIAGNOSIS — Z99.89 OSA ON CPAP: ICD-10-CM

## 2019-01-09 DIAGNOSIS — G47.33 OSA ON CPAP: ICD-10-CM

## 2019-01-09 DIAGNOSIS — IMO0001 NEEDLESTICK INJURY OF FINGER, INITIAL ENCOUNTER: ICD-10-CM

## 2019-01-09 DIAGNOSIS — Z00.00 ANNUAL PHYSICAL EXAM: Primary | ICD-10-CM

## 2019-01-09 DIAGNOSIS — E11.65 UNCONTROLLED TYPE 2 DIABETES MELLITUS WITH HYPERGLYCEMIA, WITHOUT LONG-TERM CURRENT USE OF INSULIN (HCC): ICD-10-CM

## 2019-01-09 DIAGNOSIS — I48.20 CHRONIC ATRIAL FIBRILLATION (HCC): ICD-10-CM

## 2019-01-09 PROCEDURE — 99396 PREV VISIT EST AGE 40-64: CPT | Performed by: FAMILY MEDICINE

## 2019-01-09 NOTE — PROGRESS NOTES
Patient presents with:  Physical: annual physical, no labs, not fasting     HPI:   Susie Stephens is a 62year old male who presents for a complete physical exam.     Last colonoscopy:  2018 - repeat 3 yrs. Last PSA:  Normal a year ago.   Declining PSA c Disp: 20 tablet Rfl: 0   METFORMIN HCL  MG Oral Tablet 24 Hr TAKE 2 TABLETS(1000 MG) BY MOUTH TWICE DAILY WITH MEALS Disp: 120 tablet Rfl: 5   OMEPRAZOLE 40 MG Oral Capsule Delayed Release TAKE ONE CAPSULE BY MOUTH DAILY 1/2 HOUR BEFORE BREAKFAST Dis Diabetes Brother    • Heart Attack Maternal Grandfather       Social History:  Social History    Tobacco Use      Smoking status: Never Smoker      Smokeless tobacco: Never Used      Tobacco comment: 1 cigar a year     Alcohol use:  Yes      Alcohol/week: 0 without long-term current use of insulin ([facility] [address]  Will eventually need A1C, micro for this year    4. Needlestick injury of finger, initial encounter  Discussed getting labs, important to get a baseline  He is not ready to get these labs.   I discussed the va

## 2019-01-15 ENCOUNTER — HOSPITAL ENCOUNTER (OUTPATIENT)
Dept: LAB | Facility: HOSPITAL | Age: 59
Discharge: HOME | End: 2019-01-15
Attending: FAMILY MEDICINE
Payer: COMMERCIAL

## 2019-01-15 ENCOUNTER — PRIOR ORIGINAL RECORDS (OUTPATIENT)
Dept: OTHER | Age: 59
End: 2019-01-15

## 2019-01-15 DIAGNOSIS — IMO0001 NEEDLESTICK INJURY OF FINGER, INITIAL ENCOUNTER: ICD-10-CM

## 2019-01-15 DIAGNOSIS — IMO0001 NEEDLESTICK INJURY OF FINGER, INITIAL ENCOUNTER: Primary | ICD-10-CM

## 2019-01-15 LAB
HBV SURFACE AB SER QL: NONREACTIVE
HBV SURFACE AB SERPL IA-ACNC: <3.1 MIU/ML
HBV SURFACE AG SER-ACNC: <0.1 [IU]/L
HBV SURFACE AG SERPL QL IA: NONREACTIVE
HCV AB SERPL QL IA: NONREACTIVE
INR: 3.1

## 2019-01-15 PROCEDURE — 87340 HEPATITIS B SURFACE AG IA: CPT

## 2019-01-15 PROCEDURE — 87389 HIV-1 AG W/HIV-1&-2 AB AG IA: CPT

## 2019-01-15 PROCEDURE — 86803 HEPATITIS C AB TEST: CPT

## 2019-01-15 PROCEDURE — 36415 COLL VENOUS BLD VENIPUNCTURE: CPT

## 2019-01-15 PROCEDURE — 86706 HEP B SURFACE ANTIBODY: CPT

## 2019-02-15 ENCOUNTER — PRIOR ORIGINAL RECORDS (OUTPATIENT)
Dept: OTHER | Age: 59
End: 2019-02-15

## 2019-02-15 ENCOUNTER — HOSPITAL ENCOUNTER (OUTPATIENT)
Dept: CV DIAGNOSTICS | Facility: HOSPITAL | Age: 59
Discharge: HOME | End: 2019-02-15
Attending: INTERNAL MEDICINE
Payer: COMMERCIAL

## 2019-02-15 ENCOUNTER — HOSPITAL ENCOUNTER (OUTPATIENT)
Dept: LAB | Facility: HOSPITAL | Age: 59
Discharge: HOME | End: 2019-02-15
Attending: INTERNAL MEDICINE
Payer: COMMERCIAL

## 2019-02-15 DIAGNOSIS — I48.20 ATRIAL FIBRILLATION, CHRONIC (HCC): ICD-10-CM

## 2019-02-15 DIAGNOSIS — I48.91 ATRIAL FIBRILLATION (HCC): ICD-10-CM

## 2019-02-15 LAB
INR: 2.1
POC INR: 2 (ref 0.8–1.3)

## 2019-02-15 PROCEDURE — 93226 XTRNL ECG REC<48 HR SCAN A/R: CPT | Performed by: INTERNAL MEDICINE

## 2019-02-15 PROCEDURE — 85610 PROTHROMBIN TIME: CPT

## 2019-02-15 PROCEDURE — 93225 XTRNL ECG REC<48 HRS REC: CPT | Performed by: INTERNAL MEDICINE

## 2019-02-15 PROCEDURE — 93227 XTRNL ECG REC<48 HR R&I: CPT | Performed by: INTERNAL MEDICINE

## 2019-02-22 ENCOUNTER — ANTI-COAG (OUTPATIENT)
Dept: CARDIOLOGY | Age: 59
End: 2019-02-22

## 2019-02-22 PROBLEM — I48.91 UNSPECIFIED ATRIAL FIBRILLATION (CMD): Status: ACTIVE | Noted: 2019-02-22

## 2019-02-28 VITALS
WEIGHT: 278 LBS | BODY MASS INDEX: 38.92 KG/M2 | DIASTOLIC BLOOD PRESSURE: 56 MMHG | SYSTOLIC BLOOD PRESSURE: 110 MMHG | HEART RATE: 62 BPM | HEIGHT: 71 IN

## 2019-02-28 VITALS
WEIGHT: 269 LBS | HEART RATE: 61 BPM | HEIGHT: 71 IN | DIASTOLIC BLOOD PRESSURE: 64 MMHG | SYSTOLIC BLOOD PRESSURE: 112 MMHG | BODY MASS INDEX: 37.66 KG/M2

## 2019-02-28 VITALS
DIASTOLIC BLOOD PRESSURE: 58 MMHG | HEART RATE: 60 BPM | HEIGHT: 71 IN | SYSTOLIC BLOOD PRESSURE: 100 MMHG | WEIGHT: 270 LBS | BODY MASS INDEX: 37.8 KG/M2

## 2019-03-01 VITALS
BODY MASS INDEX: 38.78 KG/M2 | WEIGHT: 277 LBS | HEIGHT: 71 IN | SYSTOLIC BLOOD PRESSURE: 112 MMHG | DIASTOLIC BLOOD PRESSURE: 70 MMHG | HEART RATE: 72 BPM

## 2019-03-05 ENCOUNTER — TELEPHONE (OUTPATIENT)
Dept: CARDIOLOGY | Age: 59
End: 2019-03-05

## 2019-03-06 RX ORDER — DIGOXIN 125 MCG
1 TABLET ORAL DAILY
COMMUNITY
Start: 2019-02-15 | End: 2019-05-19 | Stop reason: SDUPTHER

## 2019-03-06 RX ORDER — CARVEDILOL 25 MG/1
1 TABLET ORAL 2 TIMES DAILY
COMMUNITY
Start: 2018-11-28 | End: 2019-09-18 | Stop reason: SDUPTHER

## 2019-03-06 RX ORDER — ATORVASTATIN CALCIUM 40 MG/1
1 TABLET, FILM COATED ORAL AT BEDTIME
COMMUNITY
Start: 2018-06-13 | End: 2019-06-24 | Stop reason: SDUPTHER

## 2019-03-06 RX ORDER — WARFARIN SODIUM 5 MG/1
TABLET ORAL
COMMUNITY
Start: 2019-02-15 | End: 2019-05-02 | Stop reason: SDUPTHER

## 2019-03-06 RX ORDER — LISINOPRIL 20 MG/1
1 TABLET ORAL DAILY
COMMUNITY
Start: 2018-05-23 | End: 2019-05-19 | Stop reason: SDUPTHER

## 2019-03-06 RX ORDER — FUROSEMIDE 20 MG/1
1 TABLET ORAL DAILY
COMMUNITY
Start: 2018-02-15 | End: 2019-05-02 | Stop reason: SDUPTHER

## 2019-03-06 RX ORDER — GLIPIZIDE 10 MG/1
0.5 TABLET ORAL 2 TIMES DAILY
COMMUNITY
Start: 2018-06-13 | End: 2020-12-02 | Stop reason: SDUPTHER

## 2019-03-12 ENCOUNTER — HOSPITAL ENCOUNTER (OUTPATIENT)
Dept: LAB | Facility: HOSPITAL | Age: 59
Discharge: HOME | End: 2019-03-12
Attending: INTERNAL MEDICINE
Payer: COMMERCIAL

## 2019-03-12 ENCOUNTER — ANTI-COAG (OUTPATIENT)
Dept: CARDIOLOGY | Age: 59
End: 2019-03-12

## 2019-03-12 DIAGNOSIS — I48.91 ATRIAL FIBRILLATION (HCC): ICD-10-CM

## 2019-03-12 LAB
ALBUMIN SERPL-MCNC: 3.9 G/DL (ref 3.4–5)
ALBUMIN/GLOB SERPL: 1.1 {RATIO} (ref 1–2)
ALP LIVER SERPL-CCNC: 80 U/L (ref 45–117)
ALT SERPL-CCNC: 34 U/L (ref 16–61)
ANION GAP SERPL CALC-SCNC: 7 MMOL/L (ref 0–18)
AST SERPL-CCNC: 34 U/L (ref 15–37)
BILIRUB SERPL-MCNC: 0.8 MG/DL (ref 0.1–2)
BUN BLD-MCNC: 19 MG/DL (ref 7–18)
BUN/CREAT SERPL: 17.8 (ref 10–20)
CALCIUM BLD-MCNC: 8.4 MG/DL (ref 8.5–10.1)
CHLORIDE SERPL-SCNC: 101 MMOL/L (ref 98–107)
CHOLEST SMN-MCNC: 195 MG/DL (ref ?–200)
CO2 SERPL-SCNC: 28 MMOL/L (ref 21–32)
CREAT BLD-MCNC: 1.07 MG/DL (ref 0.7–1.3)
EST. AVERAGE GLUCOSE BLD GHB EST-MCNC: 252 MG/DL (ref 68–126)
GLOBULIN PLAS-MCNC: 3.6 G/DL (ref 2.8–4.4)
GLUCOSE BLD-MCNC: 276 MG/DL (ref 70–99)
HBA1C MFR BLD HPLC: 10.4 % (ref ?–5.7)
HDLC SERPL-MCNC: 33 MG/DL (ref 40–59)
INR PPP: 2.2
LDLC SERPL DIRECT ASSAY-MCNC: 109 MG/DL (ref ?–100)
M PROTEIN MFR SERPL ELPH: 7.5 G/DL (ref 6.4–8.2)
NONHDLC SERPL-MCNC: 162 MG/DL (ref ?–130)
OSMOLALITY SERPL CALC.SUM OF ELEC: 294 MOSM/KG (ref 275–295)
POC INR: 2.2 (ref 0.8–1.3)
POTASSIUM SERPL-SCNC: 3.9 MMOL/L (ref 3.5–5.1)
SODIUM SERPL-SCNC: 136 MMOL/L (ref 136–145)
TRIGL SERPL-MCNC: 486 MG/DL (ref 30–149)

## 2019-03-12 PROCEDURE — 80053 COMPREHEN METABOLIC PANEL: CPT | Performed by: INTERNAL MEDICINE

## 2019-03-12 PROCEDURE — 36415 COLL VENOUS BLD VENIPUNCTURE: CPT | Performed by: INTERNAL MEDICINE

## 2019-03-12 PROCEDURE — 83721 ASSAY OF BLOOD LIPOPROTEIN: CPT | Performed by: INTERNAL MEDICINE

## 2019-03-12 PROCEDURE — 80061 LIPID PANEL: CPT | Performed by: INTERNAL MEDICINE

## 2019-03-12 PROCEDURE — 85610 PROTHROMBIN TIME: CPT

## 2019-03-12 PROCEDURE — 83036 HEMOGLOBIN GLYCOSYLATED A1C: CPT | Performed by: INTERNAL MEDICINE

## 2019-03-14 ENCOUNTER — PATIENT MESSAGE (OUTPATIENT)
Dept: FAMILY MEDICINE CLINIC | Facility: CLINIC | Age: 59
End: 2019-03-14

## 2019-03-14 ENCOUNTER — TELEPHONE (OUTPATIENT)
Dept: CARDIOLOGY | Age: 59
End: 2019-03-14

## 2019-03-14 DIAGNOSIS — E11.65 UNCONTROLLED TYPE 2 DIABETES MELLITUS WITH HYPERGLYCEMIA, WITHOUT LONG-TERM CURRENT USE OF INSULIN (HCC): ICD-10-CM

## 2019-03-14 NOTE — TELEPHONE ENCOUNTER
From: Dottie Taylor  To: Evelyn Velásquez MD  Sent: 3/14/2019 10:19 AM CDT  Subject: Test Results Question    Had blood work for Dr Charly Rodrigues and Dr Hosea Galindo for my heart issues.  Dr Grissom Friday nurse called me and was very concerned about my sugars as in her word

## 2019-03-15 RX ORDER — GLIPIZIDE 10 MG/1
10 TABLET ORAL
Qty: 60 TABLET | Refills: 5 | Status: SHIPPED | OUTPATIENT
Start: 2019-03-15 | End: 2019-09-18

## 2019-03-15 RX ORDER — METFORMIN HYDROCHLORIDE 500 MG/1
TABLET, EXTENDED RELEASE ORAL
Qty: 120 TABLET | Refills: 1 | Status: SHIPPED | OUTPATIENT
Start: 2019-03-15 | End: 2019-05-19

## 2019-03-15 NOTE — TELEPHONE ENCOUNTER
Refill request for Glipizide fails protocol because last A1C was 10.4 on 3/12/19.     LOV 1/9/19. No future appointments.        Will you authorize 90 day refill? Routed to Dr Kailey Hurtado. When do you want pt to schedule appt?

## 2019-03-15 NOTE — TELEPHONE ENCOUNTER
Refill request for Metformin fails protocol because last A1C was 10.4 on 3/12/19. LOV 1/9/19. No future appointments. Will you authorize 90 day refill? Routed to Dr Ed Sim.

## 2019-04-17 ENCOUNTER — OFFICE VISIT (OUTPATIENT)
Dept: CARDIOLOGY | Age: 59
End: 2019-04-17

## 2019-04-17 VITALS
SYSTOLIC BLOOD PRESSURE: 126 MMHG | BODY MASS INDEX: 36.96 KG/M2 | HEIGHT: 71 IN | WEIGHT: 264 LBS | HEART RATE: 64 BPM | DIASTOLIC BLOOD PRESSURE: 82 MMHG

## 2019-04-17 DIAGNOSIS — E66.9 CLASS 2 OBESITY WITH BODY MASS INDEX (BMI) OF 37.0 TO 37.9 IN ADULT, UNSPECIFIED OBESITY TYPE, UNSPECIFIED WHETHER SERIOUS COMORBIDITY PRESENT: ICD-10-CM

## 2019-04-17 DIAGNOSIS — I48.20 CHRONIC ATRIAL FIBRILLATION (CMD): ICD-10-CM

## 2019-04-17 DIAGNOSIS — G47.33 OBSTRUCTIVE SLEEP APNEA SYNDROME: ICD-10-CM

## 2019-04-17 DIAGNOSIS — E11.9 TYPE 2 DIABETES MELLITUS WITHOUT COMPLICATION, WITHOUT LONG-TERM CURRENT USE OF INSULIN (CMD): Primary | ICD-10-CM

## 2019-04-17 DIAGNOSIS — G47.33 OBSTRUCTIVE SLEEP APNEA SYNDROME: Primary | ICD-10-CM

## 2019-04-17 DIAGNOSIS — E78.00 PURE HYPERCHOLESTEROLEMIA: ICD-10-CM

## 2019-04-17 DIAGNOSIS — Z79.01 ANTICOAGULATED ON COUMADIN: ICD-10-CM

## 2019-04-17 PROCEDURE — 3079F DIAST BP 80-89 MM HG: CPT | Performed by: INTERNAL MEDICINE

## 2019-04-17 PROCEDURE — 3074F SYST BP LT 130 MM HG: CPT | Performed by: INTERNAL MEDICINE

## 2019-04-17 PROCEDURE — 99214 OFFICE O/P EST MOD 30 MIN: CPT | Performed by: INTERNAL MEDICINE

## 2019-04-17 RX ORDER — ALBUTEROL SULFATE 90 UG/1
2 AEROSOL, METERED RESPIRATORY (INHALATION)
COMMUNITY
Start: 2018-03-14

## 2019-04-17 RX ORDER — METFORMIN HYDROCHLORIDE 500 MG/1
500 TABLET, EXTENDED RELEASE ORAL
COMMUNITY
Start: 2019-03-15 | End: 2021-04-07 | Stop reason: CLARIF

## 2019-04-17 RX ORDER — GLIPIZIDE 10 MG/1
10 TABLET ORAL
COMMUNITY
End: 2020-12-02 | Stop reason: SDUPTHER

## 2019-04-17 RX ORDER — ALPRAZOLAM 0.25 MG/1
0.25 TABLET ORAL
COMMUNITY
Start: 2018-12-31 | End: 2020-12-02 | Stop reason: SDUPTHER

## 2019-04-17 RX ORDER — NICOTINE POLACRILEX 4 MG/1
20 GUM, CHEWING ORAL DAILY
COMMUNITY
Start: 2019-02-14

## 2019-04-17 SDOH — HEALTH STABILITY: PHYSICAL HEALTH: ON AVERAGE, HOW MANY MINUTES DO YOU ENGAGE IN EXERCISE AT THIS LEVEL?: 0 MIN

## 2019-04-17 SDOH — HEALTH STABILITY: PHYSICAL HEALTH: ON AVERAGE, HOW MANY DAYS PER WEEK DO YOU ENGAGE IN MODERATE TO STRENUOUS EXERCISE (LIKE A BRISK WALK)?: 0 DAYS

## 2019-04-17 ASSESSMENT — ENCOUNTER SYMPTOMS
WEIGHT GAIN: 0
ALLERGIC/IMMUNOLOGIC COMMENTS: NO NEW FOOD ALLERGIES
SUSPICIOUS LESIONS: 0
FEVER: 0
COUGH: 0
BRUISES/BLEEDS EASILY: 0
HEMOPTYSIS: 0
HEMATOCHEZIA: 0
CHILLS: 0
WEIGHT LOSS: 0

## 2019-04-17 ASSESSMENT — PATIENT HEALTH QUESTIONNAIRE - PHQ9
2. FEELING DOWN, DEPRESSED OR HOPELESS: NOT AT ALL
1. LITTLE INTEREST OR PLEASURE IN DOING THINGS: NOT AT ALL
SUM OF ALL RESPONSES TO PHQ9 QUESTIONS 1 AND 2: 0
1. LITTLE INTEREST OR PLEASURE IN DOING THINGS: NOT AT ALL
2. FEELING DOWN, DEPRESSED OR HOPELESS: NOT AT ALL
SUM OF ALL RESPONSES TO PHQ9 QUESTIONS 1 AND 2: 0

## 2019-04-17 ASSESSMENT — PAIN SCALES - GENERAL: PAINLEVEL: 0

## 2019-04-23 ENCOUNTER — ANTI-COAG (OUTPATIENT)
Dept: CARDIOLOGY | Age: 59
End: 2019-04-23

## 2019-04-23 DIAGNOSIS — I48.20 CHRONIC ATRIAL FIBRILLATION (CMD): ICD-10-CM

## 2019-05-06 RX ORDER — FUROSEMIDE 20 MG/1
TABLET ORAL DAILY
Qty: 30 TABLET | Refills: 5 | Status: SHIPPED | OUTPATIENT
Start: 2019-05-06 | End: 2019-11-17 | Stop reason: SDUPTHER

## 2019-05-06 RX ORDER — WARFARIN SODIUM 5 MG/1
TABLET ORAL
Qty: 45 TABLET | Refills: 0 | Status: SHIPPED | OUTPATIENT
Start: 2019-05-06 | End: 2019-06-10 | Stop reason: SDUPTHER

## 2019-05-19 DIAGNOSIS — E11.65 UNCONTROLLED TYPE 2 DIABETES MELLITUS WITH HYPERGLYCEMIA, WITHOUT LONG-TERM CURRENT USE OF INSULIN (HCC): ICD-10-CM

## 2019-05-20 RX ORDER — LISINOPRIL 20 MG/1
TABLET ORAL DAILY
Qty: 90 TABLET | Refills: 3 | Status: SHIPPED | OUTPATIENT
Start: 2019-05-20 | End: 2020-05-14 | Stop reason: SDUPTHER

## 2019-05-20 RX ORDER — DIGOXIN 125 UG/1
TABLET ORAL
Qty: 90 TABLET | Refills: 3 | Status: SHIPPED | OUTPATIENT
Start: 2019-05-20 | End: 2020-05-14 | Stop reason: SDUPTHER

## 2019-05-21 RX ORDER — METFORMIN HYDROCHLORIDE 500 MG/1
TABLET, EXTENDED RELEASE ORAL
Qty: 120 TABLET | Refills: 2 | Status: SHIPPED | OUTPATIENT
Start: 2019-05-21 | End: 2019-08-22

## 2019-05-21 NOTE — TELEPHONE ENCOUNTER
Refill request for Metformin fails protocol because last A1C was 10.4 on 3/12/19. LOV 1/9/19. No future appointments.       Routed to Dr Maria L Gray

## 2019-06-11 RX ORDER — WARFARIN SODIUM 5 MG/1
TABLET ORAL
Qty: 20 TABLET | Refills: 0 | Status: SHIPPED | OUTPATIENT
Start: 2019-06-11 | End: 2019-06-24 | Stop reason: SDUPTHER

## 2019-06-12 ENCOUNTER — TELEPHONE (OUTPATIENT)
Dept: FAMILY MEDICINE CLINIC | Facility: CLINIC | Age: 59
End: 2019-06-12

## 2019-06-12 DIAGNOSIS — E11.65 UNCONTROLLED TYPE 2 DIABETES MELLITUS WITH HYPERGLYCEMIA (HCC): Primary | ICD-10-CM

## 2019-06-12 NOTE — TELEPHONE ENCOUNTER
Referral request Dr. Stevie Wilson M.D.,Ophthalmology    Patient seen by Dr. Sharmin Madison M.D. 1/9/19  DM2 - taking glipizide, metformin. Last A1C summer 2018 7.7. A year ago A1C 10.4.

## 2019-06-19 ENCOUNTER — ANTI-COAG (OUTPATIENT)
Dept: CARDIOLOGY | Age: 59
End: 2019-06-19

## 2019-06-19 ENCOUNTER — HOSPITAL ENCOUNTER (OUTPATIENT)
Dept: LAB | Facility: HOSPITAL | Age: 59
Discharge: HOME | End: 2019-06-19
Attending: INTERNAL MEDICINE
Payer: COMMERCIAL

## 2019-06-19 DIAGNOSIS — I48.91 ATRIAL FIBRILLATION (HCC): ICD-10-CM

## 2019-06-19 DIAGNOSIS — I48.20 CHRONIC ATRIAL FIBRILLATION (CMD): ICD-10-CM

## 2019-06-19 LAB — INR PPP: 2.4

## 2019-06-19 PROCEDURE — 85610 PROTHROMBIN TIME: CPT

## 2019-06-25 RX ORDER — WARFARIN SODIUM 5 MG/1
TABLET ORAL
Qty: 45 TABLET | Refills: 0 | Status: SHIPPED | OUTPATIENT
Start: 2019-06-25 | End: 2019-07-25 | Stop reason: SDUPTHER

## 2019-06-25 RX ORDER — ATORVASTATIN CALCIUM 40 MG/1
TABLET, FILM COATED ORAL AT BEDTIME
Qty: 30 TABLET | Refills: 3 | Status: SHIPPED | OUTPATIENT
Start: 2019-06-25 | End: 2019-10-20 | Stop reason: SDUPTHER

## 2019-07-25 ENCOUNTER — PATIENT MESSAGE (OUTPATIENT)
Dept: FAMILY MEDICINE CLINIC | Facility: CLINIC | Age: 59
End: 2019-07-25

## 2019-07-25 ENCOUNTER — TELEPHONE (OUTPATIENT)
Dept: CARDIOLOGY | Age: 59
End: 2019-07-25

## 2019-07-25 DIAGNOSIS — E11.65 UNCONTROLLED TYPE 2 DIABETES MELLITUS WITH HYPERGLYCEMIA (HCC): Primary | ICD-10-CM

## 2019-07-25 RX ORDER — WARFARIN SODIUM 5 MG/1
TABLET ORAL
Qty: 10 TABLET | Refills: 0 | Status: SHIPPED | OUTPATIENT
Start: 2019-07-25 | End: 2019-08-09 | Stop reason: SDUPTHER

## 2019-07-25 NOTE — TELEPHONE ENCOUNTER
From: Ellen Shelton  To: Radha Perez MD  Sent: 7/25/2019 3:29 PM CDT  Subject: Non-Urgent Medical Question    The Mrs is reminding me about getting a blood draw for sugars. Do I need an order for that and if so could you put one in.  Then I can sche

## 2019-07-31 ENCOUNTER — ANTI-COAG (OUTPATIENT)
Dept: CARDIOLOGY | Age: 59
End: 2019-07-31

## 2019-07-31 DIAGNOSIS — I48.20 CHRONIC ATRIAL FIBRILLATION (CMD): ICD-10-CM

## 2019-08-06 ENCOUNTER — PATIENT OUTREACH (OUTPATIENT)
Dept: FAMILY MEDICINE CLINIC | Facility: CLINIC | Age: 59
End: 2019-08-06

## 2019-08-06 ENCOUNTER — HOSPITAL ENCOUNTER (OUTPATIENT)
Dept: LAB | Facility: HOSPITAL | Age: 59
Discharge: HOME | End: 2019-08-06
Attending: INTERNAL MEDICINE
Payer: COMMERCIAL

## 2019-08-06 ENCOUNTER — HOSPITAL ENCOUNTER (OUTPATIENT)
Dept: LAB | Facility: HOSPITAL | Age: 59
Discharge: HOME | End: 2019-08-06
Attending: FAMILY MEDICINE
Payer: COMMERCIAL

## 2019-08-06 ENCOUNTER — ANTI-COAG (OUTPATIENT)
Dept: CARDIOLOGY | Age: 59
End: 2019-08-06

## 2019-08-06 DIAGNOSIS — I48.20 CHRONIC ATRIAL FIBRILLATION (CMD): ICD-10-CM

## 2019-08-06 DIAGNOSIS — I48.91 ATRIAL FIBRILLATION (HCC): ICD-10-CM

## 2019-08-06 DIAGNOSIS — E11.65 UNCONTROLLED TYPE 2 DIABETES MELLITUS WITH HYPERGLYCEMIA (HCC): ICD-10-CM

## 2019-08-06 LAB
EST. AVERAGE GLUCOSE BLD GHB EST-MCNC: 146 MG/DL (ref 68–126)
HBA1C MFR BLD HPLC: 6.7 % (ref ?–5.7)
INR PPP: 1.5
POC INR: 1.5 (ref 0.8–1.3)

## 2019-08-06 PROCEDURE — 85610 PROTHROMBIN TIME: CPT

## 2019-08-06 PROCEDURE — 36415 COLL VENOUS BLD VENIPUNCTURE: CPT

## 2019-08-06 PROCEDURE — 83036 HEMOGLOBIN GLYCOSYLATED A1C: CPT

## 2019-08-06 NOTE — PROGRESS NOTES
Referral placed 6/12/19:      Cory Love MD   [address] [address]  Phone: [phone]  Fax: [phone]    [employer] message sent:   This is a reminder to get your diabetic eye exam.   We have placed a referral for you with Dr. Сергей Brush

## 2019-08-09 RX ORDER — WARFARIN SODIUM 5 MG/1
TABLET ORAL
Qty: 135 TABLET | Refills: 0 | Status: SHIPPED | OUTPATIENT
Start: 2019-08-09 | End: 2019-11-04 | Stop reason: SDUPTHER

## 2019-08-12 RX ORDER — WARFARIN SODIUM 5 MG/1
TABLET ORAL
Qty: 45 TABLET | Refills: 0 | OUTPATIENT
Start: 2019-08-12

## 2019-08-22 ENCOUNTER — TELEPHONE (OUTPATIENT)
Dept: FAMILY MEDICINE CLINIC | Facility: CLINIC | Age: 59
End: 2019-08-22

## 2019-08-22 DIAGNOSIS — E11.65 UNCONTROLLED TYPE 2 DIABETES MELLITUS WITH HYPERGLYCEMIA, WITHOUT LONG-TERM CURRENT USE OF INSULIN (HCC): ICD-10-CM

## 2019-08-26 RX ORDER — METFORMIN HYDROCHLORIDE 500 MG/1
TABLET, EXTENDED RELEASE ORAL
Qty: 120 TABLET | Refills: 0 | Status: SHIPPED | OUTPATIENT
Start: 2019-08-26 | End: 2019-09-04

## 2019-08-26 NOTE — TELEPHONE ENCOUNTER
LOV 1/19/19,   Last A1C was 6.7 on 8/6/19  Please advise on refill of Metformin and advise when you want Pt to follow up

## 2019-09-03 RX ORDER — WARFARIN SODIUM 5 MG/1
7.5 TABLET ORAL SEE ADMIN INSTRUCTIONS
Status: ON HOLD | COMMUNITY
Start: 2019-07-25 | End: 2021-03-24

## 2019-09-04 ENCOUNTER — OFFICE VISIT (OUTPATIENT)
Dept: FAMILY MEDICINE CLINIC | Facility: CLINIC | Age: 59
End: 2019-09-04
Payer: COMMERCIAL

## 2019-09-04 VITALS
TEMPERATURE: 98 F | BODY MASS INDEX: 35.76 KG/M2 | RESPIRATION RATE: 16 BRPM | SYSTOLIC BLOOD PRESSURE: 110 MMHG | DIASTOLIC BLOOD PRESSURE: 60 MMHG | HEART RATE: 60 BPM | WEIGHT: 258.25 LBS | HEIGHT: 71.26 IN

## 2019-09-04 DIAGNOSIS — I10 ESSENTIAL HYPERTENSION: ICD-10-CM

## 2019-09-04 DIAGNOSIS — E11.9 TYPE 2 DIABETES MELLITUS WITHOUT COMPLICATION, WITHOUT LONG-TERM CURRENT USE OF INSULIN (HCC): Primary | ICD-10-CM

## 2019-09-04 DIAGNOSIS — E78.5 HYPERLIPIDEMIA LDL GOAL <100: ICD-10-CM

## 2019-09-04 DIAGNOSIS — Z99.89 OSA ON CPAP: ICD-10-CM

## 2019-09-04 DIAGNOSIS — I48.20 CHRONIC ATRIAL FIBRILLATION (HCC): ICD-10-CM

## 2019-09-04 DIAGNOSIS — G47.33 OSA ON CPAP: ICD-10-CM

## 2019-09-04 DIAGNOSIS — Z12.5 PROSTATE CANCER SCREENING: ICD-10-CM

## 2019-09-04 PROCEDURE — 99214 OFFICE O/P EST MOD 30 MIN: CPT | Performed by: FAMILY MEDICINE

## 2019-09-04 RX ORDER — METFORMIN HYDROCHLORIDE 500 MG/1
TABLET, EXTENDED RELEASE ORAL
Qty: 360 TABLET | Refills: 3 | Status: SHIPPED | OUTPATIENT
Start: 2019-09-04 | End: 2020-09-09

## 2019-09-04 NOTE — PROGRESS NOTES
Patient presents with:  Diabetes: 6 month follow up      HPI:   Ellen Shelton is a 61year old male who presents for a diabetic visit. Improved overall. A1C 6.7. Typical blood sugar levels are controlled.     Current diabetic medications are: metform Last 6 Encounters:  09/04/19 : 258 lb 4 oz  01/09/19 : 266 lb  12/31/18 : 265 lb  07/11/18 : 278 lb 9.6 oz  06/18/18 : 276 lb  03/14/18 : 273 lb 6.4 oz      General: alert, well appearing, and in no distress  HEENT: oropharynx clear, pupils equal and react

## 2019-09-05 ENCOUNTER — ANTI-COAG (OUTPATIENT)
Dept: CARDIOLOGY | Age: 59
End: 2019-09-05

## 2019-09-05 DIAGNOSIS — I48.20 CHRONIC ATRIAL FIBRILLATION (CMD): ICD-10-CM

## 2019-09-18 DIAGNOSIS — E11.65 UNCONTROLLED TYPE 2 DIABETES MELLITUS WITH HYPERGLYCEMIA, WITHOUT LONG-TERM CURRENT USE OF INSULIN (HCC): ICD-10-CM

## 2019-09-18 RX ORDER — GLIPIZIDE 10 MG/1
TABLET ORAL
Qty: 60 TABLET | Refills: 5 | Status: SHIPPED | OUTPATIENT
Start: 2019-09-18 | End: 2020-03-16

## 2019-09-18 RX ORDER — CARVEDILOL 25 MG/1
TABLET ORAL
Qty: 60 TABLET | Refills: 6 | Status: SHIPPED | OUTPATIENT
Start: 2019-09-18 | End: 2020-04-15 | Stop reason: SDUPTHER

## 2019-09-23 DIAGNOSIS — E11.65 UNCONTROLLED TYPE 2 DIABETES MELLITUS WITH HYPERGLYCEMIA, WITHOUT LONG-TERM CURRENT USE OF INSULIN (HCC): ICD-10-CM

## 2019-09-23 RX ORDER — METFORMIN HYDROCHLORIDE 500 MG/1
TABLET, EXTENDED RELEASE ORAL
Qty: 120 TABLET | Refills: 0 | OUTPATIENT
Start: 2019-09-23

## 2019-10-07 ENCOUNTER — OFFICE VISIT (OUTPATIENT)
Dept: FAMILY MEDICINE CLINIC | Facility: CLINIC | Age: 59
End: 2019-10-07
Payer: COMMERCIAL

## 2019-10-07 ENCOUNTER — E-VISIT (OUTPATIENT)
Dept: FAMILY MEDICINE CLINIC | Facility: CLINIC | Age: 59
End: 2019-10-07

## 2019-10-07 VITALS
WEIGHT: 258.38 LBS | SYSTOLIC BLOOD PRESSURE: 128 MMHG | HEIGHT: 71 IN | TEMPERATURE: 99 F | BODY MASS INDEX: 36.17 KG/M2 | HEART RATE: 68 BPM | RESPIRATION RATE: 16 BRPM | DIASTOLIC BLOOD PRESSURE: 70 MMHG

## 2019-10-07 DIAGNOSIS — Z02.9 ENCOUNTERS FOR ADMINISTRATIVE PURPOSE: ICD-10-CM

## 2019-10-07 DIAGNOSIS — R05.9 COUGH: Primary | ICD-10-CM

## 2019-10-07 DIAGNOSIS — J06.9 ACUTE URI: Primary | ICD-10-CM

## 2019-10-07 PROCEDURE — 99213 OFFICE O/P EST LOW 20 MIN: CPT | Performed by: NURSE PRACTITIONER

## 2019-10-07 PROCEDURE — 98969 ONLINE SERVICE BY HC PRO: CPT | Performed by: NURSE PRACTITIONER

## 2019-10-07 RX ORDER — GUAIFENESIN AND CODEINE PHOSPHATE 100; 10 MG/5ML; MG/5ML
5 SOLUTION ORAL NIGHTLY PRN
Qty: 118 ML | Refills: 0 | Status: SHIPPED | OUTPATIENT
Start: 2019-10-07 | End: 2019-10-21

## 2019-10-07 RX ORDER — AMOXICILLIN AND CLAVULANATE POTASSIUM 875; 125 MG/1; MG/1
1 TABLET, FILM COATED ORAL 2 TIMES DAILY
Qty: 14 TABLET | Refills: 0 | Status: SHIPPED | OUTPATIENT
Start: 2019-10-07 | End: 2019-10-29

## 2019-10-07 NOTE — PROGRESS NOTES
Patient presents with:  Cough: Coughing up green phlegm for 6-7 days. HPI:  Presents with 6-7 day history of cough with production of green colored sputum, mild sinus congestion, \"itchy\" throat and post nasal drip.  Denies fever/chills, facial pain, 1/2 tabs daily or as directed by [facility]. Please have INR done ASAP.  Disp:  Rfl:    OMEPRAZOLE 40 MG Oral Capsule Delayed Release TAKE ONE CAPSULE BY MOUTH DAILY 1/2 HOUR BEFORE BREAKFAST Disp: 30 capsule Rfl: 3   Albuterol Sulfate HFA (PROAIR HFA exam.    Skin: Skin is warm and dry. No rash noted. No erythema. No pallor. A/P:    Acute uri  (primary encounter diagnosis)- Augmentin. Cheratussin HS PRN.  Warned pt about sedation with this medication and advised pt about necessary precautions and and be sure to use DISTILLED WATER or tap water that you have boiled. I prefer the \"squeeze bottle\" variety over the \"teapot\" style, as it allows for better cleansing.      If you use the full sinus rinse device, as above, you may also still use the Oce

## 2019-10-07 NOTE — PROGRESS NOTES
Due to patient's co-morbidities, patient was referred to [facility] or PCP to have cough evaluated. No charge for this visit.

## 2019-10-08 ENCOUNTER — HOSPITAL ENCOUNTER (OUTPATIENT)
Dept: LAB | Facility: HOSPITAL | Age: 59
Discharge: HOME | End: 2019-10-08
Attending: INTERNAL MEDICINE
Payer: COMMERCIAL

## 2019-10-08 ENCOUNTER — ANTI-COAG (OUTPATIENT)
Dept: CARDIOLOGY | Age: 59
End: 2019-10-08

## 2019-10-08 DIAGNOSIS — I48.91 ATRIAL FIBRILLATION (HCC): ICD-10-CM

## 2019-10-08 DIAGNOSIS — I48.20 CHRONIC ATRIAL FIBRILLATION (CMD): ICD-10-CM

## 2019-10-08 LAB — INR PPP: 2.6

## 2019-10-08 PROCEDURE — 85610 PROTHROMBIN TIME: CPT

## 2019-10-09 ENCOUNTER — OFFICE VISIT (OUTPATIENT)
Dept: CARDIOLOGY | Age: 59
End: 2019-10-09

## 2019-10-09 VITALS
BODY MASS INDEX: 35.98 KG/M2 | HEART RATE: 64 BPM | HEIGHT: 71 IN | SYSTOLIC BLOOD PRESSURE: 100 MMHG | WEIGHT: 257 LBS | DIASTOLIC BLOOD PRESSURE: 60 MMHG

## 2019-10-09 DIAGNOSIS — I48.20 CHRONIC ATRIAL FIBRILLATION (CMD): Primary | ICD-10-CM

## 2019-10-09 DIAGNOSIS — E78.00 PURE HYPERCHOLESTEROLEMIA: ICD-10-CM

## 2019-10-09 DIAGNOSIS — E11.9 TYPE 2 DIABETES MELLITUS WITHOUT COMPLICATION, WITHOUT LONG-TERM CURRENT USE OF INSULIN (CMD): ICD-10-CM

## 2019-10-09 DIAGNOSIS — G47.33 OBSTRUCTIVE SLEEP APNEA SYNDROME: ICD-10-CM

## 2019-10-09 DIAGNOSIS — Z79.01 ANTICOAGULATED ON COUMADIN: ICD-10-CM

## 2019-10-09 DIAGNOSIS — E66.9 CLASS 2 OBESITY WITH BODY MASS INDEX (BMI) OF 37.0 TO 37.9 IN ADULT, UNSPECIFIED OBESITY TYPE, UNSPECIFIED WHETHER SERIOUS COMORBIDITY PRESENT: ICD-10-CM

## 2019-10-09 PROCEDURE — 3074F SYST BP LT 130 MM HG: CPT | Performed by: INTERNAL MEDICINE

## 2019-10-09 PROCEDURE — 99214 OFFICE O/P EST MOD 30 MIN: CPT | Performed by: INTERNAL MEDICINE

## 2019-10-09 PROCEDURE — 3078F DIAST BP <80 MM HG: CPT | Performed by: INTERNAL MEDICINE

## 2019-10-09 ASSESSMENT — ENCOUNTER SYMPTOMS
FEVER: 0
WEIGHT LOSS: 0
WEIGHT GAIN: 0
ALLERGIC/IMMUNOLOGIC COMMENTS: NO NEW FOOD ALLERGIES
SUSPICIOUS LESIONS: 0
CHILLS: 0
COUGH: 0
HEMATOCHEZIA: 0
HEMOPTYSIS: 0
BRUISES/BLEEDS EASILY: 0

## 2019-10-22 ENCOUNTER — APPOINTMENT (OUTPATIENT)
Dept: LAB | Facility: HOSPITAL | Age: 59
End: 2019-10-22
Attending: INTERNAL MEDICINE
Payer: COMMERCIAL

## 2019-10-22 DIAGNOSIS — E78.5 HYPERLIPIDEMIA LDL GOAL <100: ICD-10-CM

## 2019-10-22 DIAGNOSIS — E11.9 TYPE 2 DIABETES MELLITUS WITHOUT COMPLICATION, WITHOUT LONG-TERM CURRENT USE OF INSULIN (HCC): ICD-10-CM

## 2019-10-22 DIAGNOSIS — I10 ESSENTIAL HYPERTENSION: ICD-10-CM

## 2019-10-22 DIAGNOSIS — Z12.5 PROSTATE CANCER SCREENING: ICD-10-CM

## 2019-10-22 PROCEDURE — 82043 UR ALBUMIN QUANTITATIVE: CPT

## 2019-10-22 PROCEDURE — 80053 COMPREHEN METABOLIC PANEL: CPT

## 2019-10-22 PROCEDURE — 36415 COLL VENOUS BLD VENIPUNCTURE: CPT

## 2019-10-22 PROCEDURE — 82570 ASSAY OF URINE CREATININE: CPT

## 2019-10-22 PROCEDURE — 83036 HEMOGLOBIN GLYCOSYLATED A1C: CPT

## 2019-10-22 PROCEDURE — 80061 LIPID PANEL: CPT

## 2019-10-22 RX ORDER — ATORVASTATIN CALCIUM 40 MG/1
TABLET, FILM COATED ORAL AT BEDTIME
Qty: 90 TABLET | Refills: 1 | Status: SHIPPED | OUTPATIENT
Start: 2019-10-22 | End: 2020-04-20 | Stop reason: SDUPTHER

## 2019-10-23 ENCOUNTER — TELEPHONE (OUTPATIENT)
Dept: CARDIOLOGY | Age: 59
End: 2019-10-23

## 2019-11-07 RX ORDER — WARFARIN SODIUM 5 MG/1
TABLET ORAL
Qty: 135 TABLET | Refills: 0 | Status: SHIPPED | OUTPATIENT
Start: 2019-11-07 | End: 2020-10-15 | Stop reason: SDUPTHER

## 2019-11-19 RX ORDER — FUROSEMIDE 20 MG/1
TABLET ORAL DAILY
Qty: 90 TABLET | Refills: 1 | Status: SHIPPED | OUTPATIENT
Start: 2019-11-19 | End: 2020-05-19 | Stop reason: SDUPTHER

## 2019-12-28 ENCOUNTER — HOSPITAL ENCOUNTER (OUTPATIENT)
Dept: LAB | Facility: HOSPITAL | Age: 59
Discharge: HOME | End: 2019-12-28
Attending: INTERNAL MEDICINE
Payer: COMMERCIAL

## 2019-12-28 DIAGNOSIS — I48.91 ATRIAL FIBRILLATION (HCC): ICD-10-CM

## 2019-12-28 LAB — INR PPP: 2.4

## 2019-12-28 PROCEDURE — 85610 PROTHROMBIN TIME: CPT

## 2019-12-30 ENCOUNTER — ANTI-COAG (OUTPATIENT)
Dept: CARDIOLOGY | Age: 59
End: 2019-12-30

## 2019-12-30 DIAGNOSIS — I48.20 CHRONIC ATRIAL FIBRILLATION (CMD): ICD-10-CM

## 2020-02-04 ENCOUNTER — PATIENT OUTREACH (OUTPATIENT)
Dept: FAMILY MEDICINE CLINIC | Facility: CLINIC | Age: 60
End: 2020-02-04

## 2020-02-04 DIAGNOSIS — E11.9 DIABETIC EYE EXAM (HCC): Primary | ICD-10-CM

## 2020-02-04 DIAGNOSIS — Z01.00 DIABETIC EYE EXAM (HCC): Primary | ICD-10-CM

## 2020-02-10 ENCOUNTER — TELEPHONE (OUTPATIENT)
Dept: FAMILY MEDICINE CLINIC | Facility: CLINIC | Age: 60
End: 2020-02-10

## 2020-02-10 NOTE — TELEPHONE ENCOUNTER
Patient sent in a message for an appt thru My chart :  right leg numbness, lower back pain on right side, red blotches on right knee area. Requesting appt but wanted to send message too.

## 2020-02-11 ENCOUNTER — OFFICE VISIT (OUTPATIENT)
Dept: FAMILY MEDICINE CLINIC | Facility: CLINIC | Age: 60
End: 2020-02-11
Payer: COMMERCIAL

## 2020-02-11 VITALS
TEMPERATURE: 99 F | SYSTOLIC BLOOD PRESSURE: 130 MMHG | HEART RATE: 68 BPM | BODY MASS INDEX: 35.74 KG/M2 | DIASTOLIC BLOOD PRESSURE: 74 MMHG | HEIGHT: 71.5 IN | RESPIRATION RATE: 16 BRPM | WEIGHT: 261 LBS

## 2020-02-11 DIAGNOSIS — J45.20 MILD INTERMITTENT ASTHMA WITHOUT COMPLICATION: ICD-10-CM

## 2020-02-11 DIAGNOSIS — B02.9 HERPES ZOSTER WITHOUT COMPLICATION: Primary | ICD-10-CM

## 2020-02-11 PROCEDURE — 99213 OFFICE O/P EST LOW 20 MIN: CPT | Performed by: FAMILY MEDICINE

## 2020-02-11 RX ORDER — VALACYCLOVIR HYDROCHLORIDE 1 G/1
1 TABLET, FILM COATED ORAL 3 TIMES DAILY
Qty: 21 TABLET | Refills: 0 | Status: SHIPPED | OUTPATIENT
Start: 2020-02-11 | End: 2020-02-18

## 2020-02-11 NOTE — PROGRESS NOTES
Patient presents with:  Numbness: Right leg numbness x10 days  Hip Pain: Right hip pain  Derm Problem: Red \"blotches\" on right leg  Asthma: ACT due     HPI:   Edis Perdue is a 61year old male who presents to the office for eval of R hip/back pain an

## 2020-02-17 ENCOUNTER — ANTI-COAG (OUTPATIENT)
Dept: CARDIOLOGY | Age: 60
End: 2020-02-17

## 2020-02-17 ENCOUNTER — HOSPITAL ENCOUNTER (OUTPATIENT)
Dept: LAB | Facility: HOSPITAL | Age: 60
Discharge: HOME | End: 2020-02-17
Attending: INTERNAL MEDICINE
Payer: COMMERCIAL

## 2020-02-17 DIAGNOSIS — I48.20 CHRONIC ATRIAL FIBRILLATION (CMD): ICD-10-CM

## 2020-02-17 DIAGNOSIS — Z79.01 LONG TERM (CURRENT) USE OF ANTICOAGULANTS: ICD-10-CM

## 2020-02-17 DIAGNOSIS — I48.20 CHRONIC ATRIAL FIBRILLATION (CMD): Primary | ICD-10-CM

## 2020-02-17 LAB
INR PPP: 2.7 (ref 2–3)
POC INR: 2.7 (ref 0.8–1.3)

## 2020-02-17 PROCEDURE — 85610 PROTHROMBIN TIME: CPT

## 2020-02-17 RX ORDER — WARFARIN SODIUM 5 MG/1
TABLET ORAL
Qty: 120 TABLET | Refills: 0 | Status: SHIPPED | OUTPATIENT
Start: 2020-02-17 | End: 2020-06-12

## 2020-02-17 RX ORDER — WARFARIN SODIUM 5 MG/1
5-7.5 TABLET ORAL DAILY
Qty: 45 TABLET | Refills: 0 | Status: SHIPPED | OUTPATIENT
Start: 2020-02-17 | End: 2020-02-17 | Stop reason: SDUPTHER

## 2020-03-04 ENCOUNTER — OFFICE VISIT (OUTPATIENT)
Dept: FAMILY MEDICINE CLINIC | Facility: CLINIC | Age: 60
End: 2020-03-04
Payer: COMMERCIAL

## 2020-03-04 VITALS
BODY MASS INDEX: 34.92 KG/M2 | OXYGEN SATURATION: 98 % | DIASTOLIC BLOOD PRESSURE: 68 MMHG | HEIGHT: 71.5 IN | WEIGHT: 255 LBS | HEART RATE: 53 BPM | SYSTOLIC BLOOD PRESSURE: 124 MMHG | TEMPERATURE: 98 F | RESPIRATION RATE: 16 BRPM

## 2020-03-04 DIAGNOSIS — Z99.89 OSA ON CPAP: ICD-10-CM

## 2020-03-04 DIAGNOSIS — E78.5 HYPERLIPIDEMIA LDL GOAL <100: ICD-10-CM

## 2020-03-04 DIAGNOSIS — I83.11 VARICOSE VEINS OF RIGHT LOWER EXTREMITY WITH INFLAMMATION: ICD-10-CM

## 2020-03-04 DIAGNOSIS — E11.9 TYPE 2 DIABETES MELLITUS WITHOUT COMPLICATION, WITHOUT LONG-TERM CURRENT USE OF INSULIN (HCC): ICD-10-CM

## 2020-03-04 DIAGNOSIS — I10 ESSENTIAL HYPERTENSION: ICD-10-CM

## 2020-03-04 DIAGNOSIS — G47.33 OSA ON CPAP: ICD-10-CM

## 2020-03-04 DIAGNOSIS — Z00.00 ANNUAL PHYSICAL EXAM: Primary | ICD-10-CM

## 2020-03-04 DIAGNOSIS — I48.20 CHRONIC ATRIAL FIBRILLATION (HCC): ICD-10-CM

## 2020-03-04 DIAGNOSIS — Z23 NEED FOR 23-POLYVALENT PNEUMOCOCCAL POLYSACCHARIDE VACCINE: ICD-10-CM

## 2020-03-04 PROCEDURE — 90471 IMMUNIZATION ADMIN: CPT | Performed by: FAMILY MEDICINE

## 2020-03-04 PROCEDURE — 99396 PREV VISIT EST AGE 40-64: CPT | Performed by: FAMILY MEDICINE

## 2020-03-04 PROCEDURE — 90732 PPSV23 VACC 2 YRS+ SUBQ/IM: CPT | Performed by: FAMILY MEDICINE

## 2020-03-04 NOTE — PATIENT INSTRUCTIONS
[facility]  Dr. Bess Bey [id number]  [place], [address]    [phone]

## 2020-03-04 NOTE — PROGRESS NOTES
Patient presents with: Follow - Up: 6 month follow up. Numbness: c/o still having numbness of the right leg . HPI:   Brittany Abraham is a 61year old male who presents for a complete physical exam.     Last colonoscopy:  9/2018. Repeat 3.    Last P TAKE 2 TABLETS(1000 MG) BY MOUTH TWICE DAILY WITH MEALS 360 tablet 3   • Warfarin Sodium 5 MG Oral Tab Take 1 to 1 and 1/2 tabs daily or as directed by [facility]mad[facility] Clinic. Please have INR done ASAP.      • Albuterol Sulfate HFA (PROAIR HFA) 108 (90 Base) MCG/ Children: 3 grown boys, one passed away 12/2018 from overdose. Exercise: skating. Diet: healthy diet. Less food intake. REVIEW OF SYSTEMS:     All systems reviewed, negative other than noted above.     EXAM:   /68   Pulse 53   Temp 98.2 °F (36 well  c-scope UTD -check next year  Normal PSA  Immun utd.      2. Type 2 diabetes mellitus without complication, without long-term current use of insulin (HCC)  Check A1C  Traditionally well controlled  On glipizide, metformin.   - COMP METABOLIC PANEL (14

## 2020-03-14 DIAGNOSIS — E11.65 UNCONTROLLED TYPE 2 DIABETES MELLITUS WITH HYPERGLYCEMIA, WITHOUT LONG-TERM CURRENT USE OF INSULIN (HCC): ICD-10-CM

## 2020-03-16 RX ORDER — GLIPIZIDE 10 MG/1
TABLET ORAL
Qty: 180 TABLET | Refills: 0 | Status: SHIPPED | OUTPATIENT
Start: 2020-03-16 | End: 2020-06-15

## 2020-03-16 NOTE — TELEPHONE ENCOUNTER
Requested Prescriptions     Pending Prescriptions Disp Refills   • GLIPIZIDE 10 MG Oral Tab [Pharmacy Med Name: GLIPIZIDE 10MG TABLETS] 180 tablet 0     Sig: TAKE 1 TABLET(10 MG) BY MOUTH TWICE DAILY BEFORE MEALS     LOV 3/4/2020         Appointment caitlynu

## 2020-04-15 ENCOUNTER — APPOINTMENT (OUTPATIENT)
Dept: CARDIOLOGY | Age: 60
End: 2020-04-15

## 2020-04-15 RX ORDER — CARVEDILOL 25 MG/1
25 TABLET ORAL 2 TIMES DAILY
Qty: 180 TABLET | Refills: 1 | Status: SHIPPED | OUTPATIENT
Start: 2020-04-15 | End: 2020-11-16 | Stop reason: SDUPTHER

## 2020-04-20 RX ORDER — ATORVASTATIN CALCIUM 40 MG/1
40 TABLET, FILM COATED ORAL AT BEDTIME
Qty: 90 TABLET | Refills: 1 | Status: SHIPPED | OUTPATIENT
Start: 2020-04-20 | End: 2020-11-02

## 2020-05-14 RX ORDER — LISINOPRIL 20 MG/1
20 TABLET ORAL DAILY
Qty: 90 TABLET | Refills: 1 | Status: SHIPPED | OUTPATIENT
Start: 2020-05-14 | End: 2020-11-11 | Stop reason: SDUPTHER

## 2020-05-14 RX ORDER — DIGOXIN 125 MCG
125 TABLET ORAL DAILY
Qty: 90 TABLET | Refills: 1 | Status: SHIPPED | OUTPATIENT
Start: 2020-05-14 | End: 2020-12-23 | Stop reason: SDUPTHER

## 2020-05-19 RX ORDER — FUROSEMIDE 20 MG/1
20 TABLET ORAL DAILY
Qty: 90 TABLET | Refills: 1 | Status: SHIPPED | OUTPATIENT
Start: 2020-05-19 | End: 2020-11-16 | Stop reason: SDUPTHER

## 2020-06-12 DIAGNOSIS — E11.65 UNCONTROLLED TYPE 2 DIABETES MELLITUS WITH HYPERGLYCEMIA, WITHOUT LONG-TERM CURRENT USE OF INSULIN (HCC): ICD-10-CM

## 2020-06-12 DIAGNOSIS — I48.20 CHRONIC ATRIAL FIBRILLATION (CMD): ICD-10-CM

## 2020-06-12 RX ORDER — WARFARIN SODIUM 5 MG/1
TABLET ORAL
Qty: 120 TABLET | Refills: 0 | Status: SHIPPED | OUTPATIENT
Start: 2020-06-12 | End: 2020-09-02

## 2020-06-12 RX ORDER — WARFARIN SODIUM 5 MG/1
TABLET ORAL
Qty: 135 TABLET | OUTPATIENT
Start: 2020-06-12

## 2020-06-15 DIAGNOSIS — E11.65 UNCONTROLLED TYPE 2 DIABETES MELLITUS WITH HYPERGLYCEMIA, WITHOUT LONG-TERM CURRENT USE OF INSULIN (HCC): ICD-10-CM

## 2020-06-15 RX ORDER — GLIPIZIDE 10 MG/1
TABLET ORAL
Qty: 180 TABLET | Refills: 0 | Status: SHIPPED | OUTPATIENT
Start: 2020-06-15 | End: 2020-09-09

## 2020-06-15 RX ORDER — GLIPIZIDE 10 MG/1
TABLET ORAL
Qty: 180 TABLET | Refills: 0 | OUTPATIENT
Start: 2020-06-15

## 2020-06-15 NOTE — TELEPHONE ENCOUNTER
Refill request for:    Requested Prescriptions     Pending Prescriptions Disp Refills   • GLIPIZIDE 10 MG Oral Tab [Pharmacy Med Name: GLIPIZIDE 10MG TABLETS] 180 tablet 0     Sig: TAKE 1 TABLET(10 MG) BY MOUTH TWICE DAILY BEFORE MEALS        Last Prescrib

## 2020-07-08 ENCOUNTER — APPOINTMENT (OUTPATIENT)
Dept: CARDIOLOGY | Age: 60
End: 2020-07-08

## 2020-08-20 ENCOUNTER — E-VISIT (OUTPATIENT)
Dept: TELEHEALTH | Age: 60
End: 2020-08-20

## 2020-08-20 DIAGNOSIS — Z02.9 ADMINISTRATIVE ENCOUNTER: Primary | ICD-10-CM

## 2020-08-20 RX ORDER — TOBRAMYCIN 3 MG/ML
SOLUTION/ DROPS OPHTHALMIC
Qty: 10 ML | Refills: 0 | Status: CANCELLED | OUTPATIENT
Start: 2020-08-20

## 2020-08-20 RX ORDER — AMOXICILLIN AND CLAVULANATE POTASSIUM 875; 125 MG/1; MG/1
1 TABLET, FILM COATED ORAL 2 TIMES DAILY
Qty: 14 TABLET | Refills: 0 | Status: CANCELLED | OUTPATIENT
Start: 2020-08-20 | End: 2020-08-27

## 2020-08-20 NOTE — PROGRESS NOTES
Patient was able to get an \"Emergency Appointment\" with the [facility]. E-visit cancelled with No Charge.

## 2020-09-01 DIAGNOSIS — I48.20 CHRONIC ATRIAL FIBRILLATION (CMD): ICD-10-CM

## 2020-09-02 DIAGNOSIS — I48.20 CHRONIC ATRIAL FIBRILLATION (CMD): ICD-10-CM

## 2020-09-02 RX ORDER — WARFARIN SODIUM 5 MG/1
TABLET ORAL
Qty: 135 TABLET | OUTPATIENT
Start: 2020-09-02

## 2020-09-02 RX ORDER — WARFARIN SODIUM 5 MG/1
TABLET ORAL
Qty: 45 TABLET | Refills: 0 | Status: SHIPPED | OUTPATIENT
Start: 2020-09-02 | End: 2020-10-15 | Stop reason: SDUPTHER

## 2020-09-08 ENCOUNTER — HOSPITAL ENCOUNTER (OUTPATIENT)
Dept: LAB | Facility: HOSPITAL | Age: 60
Discharge: HOME | End: 2020-09-08
Attending: FAMILY MEDICINE
Payer: COMMERCIAL

## 2020-09-08 ENCOUNTER — ANTI-COAG (OUTPATIENT)
Dept: CARDIOLOGY | Age: 60
End: 2020-09-08

## 2020-09-08 DIAGNOSIS — Z79.01 LONG TERM (CURRENT) USE OF ANTICOAGULANTS: ICD-10-CM

## 2020-09-08 DIAGNOSIS — E11.9 TYPE 2 DIABETES MELLITUS WITHOUT COMPLICATION, WITHOUT LONG-TERM CURRENT USE OF INSULIN (HCC): ICD-10-CM

## 2020-09-08 DIAGNOSIS — I48.20 CHRONIC ATRIAL FIBRILLATION (CMD): ICD-10-CM

## 2020-09-08 DIAGNOSIS — E78.5 HYPERLIPIDEMIA LDL GOAL <100: ICD-10-CM

## 2020-09-08 DIAGNOSIS — I10 ESSENTIAL HYPERTENSION: ICD-10-CM

## 2020-09-08 LAB
ALBUMIN SERPL-MCNC: 3.9 G/DL (ref 3.4–5)
ALBUMIN/GLOB SERPL: 1.1 {RATIO} (ref 1–2)
ALP LIVER SERPL-CCNC: 68 U/L (ref 45–117)
ALT SERPL-CCNC: 27 U/L (ref 16–61)
ANION GAP SERPL CALC-SCNC: 5 MMOL/L (ref 0–18)
AST SERPL-CCNC: 20 U/L (ref 15–37)
BILIRUB SERPL-MCNC: 1 MG/DL (ref 0.1–2)
BUN BLD-MCNC: 13 MG/DL (ref 7–18)
BUN/CREAT SERPL: 14.3 (ref 10–20)
CALCIUM BLD-MCNC: 9.3 MG/DL (ref 8.5–10.1)
CHLORIDE SERPL-SCNC: 104 MMOL/L (ref 98–112)
CHOLEST SMN-MCNC: 169 MG/DL (ref ?–200)
CO2 SERPL-SCNC: 30 MMOL/L (ref 21–32)
CREAT BLD-MCNC: 0.91 MG/DL (ref 0.7–1.3)
CREAT UR-SCNC: 116 MG/DL
EST. AVERAGE GLUCOSE BLD GHB EST-MCNC: 114 MG/DL (ref 68–126)
GLOBULIN PLAS-MCNC: 3.6 G/DL (ref 2.8–4.4)
GLUCOSE BLD-MCNC: 72 MG/DL (ref 70–99)
HBA1C MFR BLD HPLC: 5.6 % (ref ?–5.7)
HDLC SERPL-MCNC: 35 MG/DL (ref 40–59)
INR PPP: 3.1
LDLC SERPL CALC-MCNC: 81 MG/DL (ref ?–100)
M PROTEIN MFR SERPL ELPH: 7.5 G/DL (ref 6.4–8.2)
MICROALBUMIN UR-MCNC: 0.99 MG/DL
MICROALBUMIN/CREAT 24H UR-RTO: 8.5 UG/MG (ref ?–30)
NONHDLC SERPL-MCNC: 134 MG/DL (ref ?–130)
OSMOLALITY SERPL CALC.SUM OF ELEC: 287 MOSM/KG (ref 275–295)
PATIENT FASTING Y/N/NP: YES
PATIENT FASTING Y/N/NP: YES
POC INR: 3.1 (ref 0.8–1.3)
POTASSIUM SERPL-SCNC: 4 MMOL/L (ref 3.5–5.1)
SODIUM SERPL-SCNC: 139 MMOL/L (ref 136–145)
TRIGL SERPL-MCNC: 263 MG/DL (ref 30–149)
VLDLC SERPL CALC-MCNC: 53 MG/DL (ref 0–30)

## 2020-09-08 PROCEDURE — 80061 LIPID PANEL: CPT

## 2020-09-08 PROCEDURE — 82043 UR ALBUMIN QUANTITATIVE: CPT

## 2020-09-08 PROCEDURE — 83036 HEMOGLOBIN GLYCOSYLATED A1C: CPT

## 2020-09-08 PROCEDURE — 82570 ASSAY OF URINE CREATININE: CPT

## 2020-09-08 PROCEDURE — 85610 PROTHROMBIN TIME: CPT

## 2020-09-08 PROCEDURE — 80053 COMPREHEN METABOLIC PANEL: CPT

## 2020-09-08 PROCEDURE — 36415 COLL VENOUS BLD VENIPUNCTURE: CPT

## 2020-09-09 ENCOUNTER — OFFICE VISIT (OUTPATIENT)
Dept: FAMILY MEDICINE CLINIC | Facility: CLINIC | Age: 60
End: 2020-09-09
Payer: COMMERCIAL

## 2020-09-09 VITALS
SYSTOLIC BLOOD PRESSURE: 114 MMHG | HEART RATE: 60 BPM | WEIGHT: 234.19 LBS | TEMPERATURE: 97 F | BODY MASS INDEX: 32.79 KG/M2 | HEIGHT: 71 IN | RESPIRATION RATE: 16 BRPM | DIASTOLIC BLOOD PRESSURE: 68 MMHG

## 2020-09-09 DIAGNOSIS — E11.9 TYPE 2 DIABETES MELLITUS WITHOUT COMPLICATION, WITHOUT LONG-TERM CURRENT USE OF INSULIN (HCC): Primary | ICD-10-CM

## 2020-09-09 DIAGNOSIS — J30.2 SEASONAL ALLERGIC RHINITIS: ICD-10-CM

## 2020-09-09 DIAGNOSIS — E78.5 HYPERLIPIDEMIA LDL GOAL <100: ICD-10-CM

## 2020-09-09 DIAGNOSIS — I10 ESSENTIAL HYPERTENSION: ICD-10-CM

## 2020-09-09 DIAGNOSIS — J45.20 MILD INTERMITTENT ASTHMA WITHOUT COMPLICATION: ICD-10-CM

## 2020-09-09 PROCEDURE — 3078F DIAST BP <80 MM HG: CPT | Performed by: FAMILY MEDICINE

## 2020-09-09 PROCEDURE — 99214 OFFICE O/P EST MOD 30 MIN: CPT | Performed by: FAMILY MEDICINE

## 2020-09-09 PROCEDURE — 3008F BODY MASS INDEX DOCD: CPT | Performed by: FAMILY MEDICINE

## 2020-09-09 PROCEDURE — 3074F SYST BP LT 130 MM HG: CPT | Performed by: FAMILY MEDICINE

## 2020-09-09 RX ORDER — AZELASTINE 1 MG/ML
1 SPRAY, METERED NASAL 2 TIMES DAILY
Qty: 1 BOTTLE | Refills: 2 | Status: SHIPPED | OUTPATIENT
Start: 2020-09-09 | End: 2021-03-29

## 2020-09-09 RX ORDER — METFORMIN HYDROCHLORIDE 500 MG/1
500 TABLET, EXTENDED RELEASE ORAL 2 TIMES DAILY WITH MEALS
Qty: 180 TABLET | Refills: 3 | COMMUNITY
Start: 2020-09-09 | End: 2020-10-23

## 2020-09-09 RX ORDER — METFORMIN HYDROCHLORIDE 500 MG/1
500 TABLET, EXTENDED RELEASE ORAL 2 TIMES DAILY WITH MEALS
Qty: 180 TABLET | Refills: 3 | COMMUNITY
Start: 2020-09-09 | End: 2020-09-09

## 2020-09-09 NOTE — PROGRESS NOTES
Patient presents with:  Test Results: Labs completed 9/8/20     HPI:   Ronak Sousa is a 61year old male who presents for a diabetic visit. A1C shows great control - 5.6  Typical blood sugar levels are not checked .   Current diabetic medications are: 97.2 °F (36.2 °C) (Temporal)   Resp 16   Ht 71\"   Wt 234 lb 3.2 oz (106.2 kg)   BMI 32.66 kg/m²   Wt Readings from Last 6 Encounters:  09/09/20 : 234 lb 3.2 oz (106.2 kg)  03/04/20 : 255 lb (115.7 kg)  02/11/20 : 261 lb (118.4 kg)  10/07/19 : 258 lb 6.4 o M. D.   EMG 3  09/09/20    Return in about 6 months (around 3/9/2021) for Annual Physical.

## 2020-09-15 DIAGNOSIS — E11.65 UNCONTROLLED TYPE 2 DIABETES MELLITUS WITH HYPERGLYCEMIA, WITHOUT LONG-TERM CURRENT USE OF INSULIN (HCC): ICD-10-CM

## 2020-09-15 RX ORDER — GLIPIZIDE 10 MG/1
TABLET ORAL
Qty: 180 TABLET | Refills: 0 | OUTPATIENT
Start: 2020-09-15

## 2020-09-17 ENCOUNTER — MED REC SCAN ONLY (OUTPATIENT)
Dept: FAMILY MEDICINE CLINIC | Facility: CLINIC | Age: 60
End: 2020-09-17

## 2020-09-23 ENCOUNTER — ANTI-COAG (OUTPATIENT)
Dept: CARDIOLOGY | Age: 60
End: 2020-09-23

## 2020-09-23 ENCOUNTER — HOSPITAL ENCOUNTER (OUTPATIENT)
Dept: LAB | Facility: HOSPITAL | Age: 60
Discharge: HOME | End: 2020-09-23
Attending: INTERNAL MEDICINE
Payer: COMMERCIAL

## 2020-09-23 DIAGNOSIS — I48.20 CHRONIC ATRIAL FIBRILLATION (CMD): ICD-10-CM

## 2020-09-23 DIAGNOSIS — E11.9 TYPE 2 DIABETES MELLITUS WITHOUT COMPLICATION, WITHOUT LONG-TERM CURRENT USE OF INSULIN (HCC): ICD-10-CM

## 2020-09-23 DIAGNOSIS — Z79.01 LONG TERM (CURRENT) USE OF ANTICOAGULANTS: ICD-10-CM

## 2020-09-23 LAB
INR PPP: 2.7
POC INR: 2.7 (ref 0.8–1.3)

## 2020-09-23 PROCEDURE — 85610 PROTHROMBIN TIME: CPT

## 2020-09-23 RX ORDER — METFORMIN HYDROCHLORIDE 500 MG/1
TABLET, EXTENDED RELEASE ORAL
Qty: 360 TABLET | Refills: 0 | OUTPATIENT
Start: 2020-09-23

## 2020-10-15 RX ORDER — WARFARIN SODIUM 5 MG/1
TABLET ORAL
Qty: 135 TABLET | Refills: 0 | Status: SHIPPED | OUTPATIENT
Start: 2020-10-15 | End: 2021-01-14 | Stop reason: SDUPTHER

## 2020-10-23 DIAGNOSIS — E11.9 TYPE 2 DIABETES MELLITUS WITHOUT COMPLICATION, WITHOUT LONG-TERM CURRENT USE OF INSULIN (HCC): ICD-10-CM

## 2020-10-23 RX ORDER — METFORMIN HYDROCHLORIDE 500 MG/1
500 TABLET, EXTENDED RELEASE ORAL 2 TIMES DAILY WITH MEALS
Qty: 180 TABLET | Refills: 0 | Status: SHIPPED | OUTPATIENT
Start: 2020-10-23 | End: 2020-10-23

## 2020-10-23 NOTE — TELEPHONE ENCOUNTER
Requested Prescriptions     Pending Prescriptions Disp Refills   • metFORMIN HCl  MG Oral Tablet 24 Hr 180 tablet 3     Sig: Take 1 tablet (500 mg total) by mouth 2 (two) times daily with meals.      LOV 9/9/2020       Appointment scheduled: 3/10/2021

## 2020-11-02 RX ORDER — ATORVASTATIN CALCIUM 40 MG/1
40 TABLET, FILM COATED ORAL AT BEDTIME
Qty: 90 TABLET | Refills: 3 | Status: SHIPPED | OUTPATIENT
Start: 2020-11-02

## 2020-11-04 ENCOUNTER — ANTI-COAG (OUTPATIENT)
Dept: CARDIOLOGY | Age: 60
End: 2020-11-04

## 2020-11-04 DIAGNOSIS — I48.20 CHRONIC ATRIAL FIBRILLATION (CMD): ICD-10-CM

## 2020-11-11 ENCOUNTER — TELEPHONE (OUTPATIENT)
Dept: CARDIOLOGY | Age: 60
End: 2020-11-11

## 2020-11-11 DIAGNOSIS — E11.9 TYPE 2 DIABETES MELLITUS WITHOUT COMPLICATION, WITHOUT LONG-TERM CURRENT USE OF INSULIN (CMD): ICD-10-CM

## 2020-11-11 DIAGNOSIS — I48.20 CHRONIC ATRIAL FIBRILLATION (CMD): ICD-10-CM

## 2020-11-11 DIAGNOSIS — E78.00 PURE HYPERCHOLESTEROLEMIA: Primary | ICD-10-CM

## 2020-11-11 RX ORDER — LISINOPRIL 20 MG/1
20 TABLET ORAL DAILY
Qty: 90 TABLET | Refills: 0 | Status: SHIPPED | OUTPATIENT
Start: 2020-11-11 | End: 2020-11-12 | Stop reason: SDUPTHER

## 2020-11-12 RX ORDER — LISINOPRIL 20 MG/1
20 TABLET ORAL DAILY
Qty: 90 TABLET | Refills: 0 | Status: SHIPPED | OUTPATIENT
Start: 2020-11-12

## 2020-11-16 ENCOUNTER — TELEPHONE (OUTPATIENT)
Dept: CARDIOLOGY | Age: 60
End: 2020-11-16

## 2020-11-16 RX ORDER — CARVEDILOL 25 MG/1
25 TABLET ORAL 2 TIMES DAILY
Qty: 180 TABLET | Refills: 0 | Status: SHIPPED | OUTPATIENT
Start: 2020-11-16 | End: 2021-03-09 | Stop reason: SDUPTHER

## 2020-11-16 RX ORDER — FUROSEMIDE 20 MG/1
20 TABLET ORAL DAILY
Qty: 90 TABLET | Refills: 0 | Status: SHIPPED | OUTPATIENT
Start: 2020-11-16

## 2020-12-12 ENCOUNTER — HOSPITAL ENCOUNTER (OUTPATIENT)
Dept: LAB | Facility: HOSPITAL | Age: 60
Discharge: HOME | End: 2020-12-12
Attending: INTERNAL MEDICINE
Payer: COMMERCIAL

## 2020-12-12 DIAGNOSIS — Z79.01 LONG TERM (CURRENT) USE OF ANTICOAGULANTS: ICD-10-CM

## 2020-12-12 LAB — INR PPP: 2.6

## 2020-12-12 PROCEDURE — 85610 PROTHROMBIN TIME: CPT

## 2020-12-14 ENCOUNTER — ANTI-COAG (OUTPATIENT)
Dept: CARDIOLOGY | Age: 60
End: 2020-12-14

## 2020-12-14 DIAGNOSIS — I48.20 CHRONIC ATRIAL FIBRILLATION (CMD): ICD-10-CM

## 2020-12-23 ENCOUNTER — OFFICE VISIT (OUTPATIENT)
Dept: CARDIOLOGY | Age: 60
End: 2020-12-23

## 2020-12-23 VITALS
DIASTOLIC BLOOD PRESSURE: 64 MMHG | WEIGHT: 230 LBS | HEART RATE: 74 BPM | BODY MASS INDEX: 32.2 KG/M2 | HEIGHT: 71 IN | SYSTOLIC BLOOD PRESSURE: 90 MMHG

## 2020-12-23 DIAGNOSIS — E11.9 TYPE 2 DIABETES MELLITUS WITHOUT COMPLICATION, WITHOUT LONG-TERM CURRENT USE OF INSULIN (CMD): ICD-10-CM

## 2020-12-23 DIAGNOSIS — I48.20 CHRONIC ATRIAL FIBRILLATION (CMD): Primary | ICD-10-CM

## 2020-12-23 DIAGNOSIS — E66.9 CLASS 2 OBESITY WITH BODY MASS INDEX (BMI) OF 37.0 TO 37.9 IN ADULT, UNSPECIFIED OBESITY TYPE, UNSPECIFIED WHETHER SERIOUS COMORBIDITY PRESENT: ICD-10-CM

## 2020-12-23 DIAGNOSIS — Z79.01 ANTICOAGULATED ON COUMADIN: ICD-10-CM

## 2020-12-23 DIAGNOSIS — E78.00 PURE HYPERCHOLESTEROLEMIA: ICD-10-CM

## 2020-12-23 PROCEDURE — 99214 OFFICE O/P EST MOD 30 MIN: CPT | Performed by: INTERNAL MEDICINE

## 2020-12-23 PROCEDURE — 3078F DIAST BP <80 MM HG: CPT | Performed by: INTERNAL MEDICINE

## 2020-12-23 PROCEDURE — 3074F SYST BP LT 130 MM HG: CPT | Performed by: INTERNAL MEDICINE

## 2020-12-23 RX ORDER — DIGOXIN 125 MCG
125 TABLET ORAL DAILY
Qty: 90 TABLET | Refills: 3 | Status: SHIPPED | OUTPATIENT
Start: 2020-12-23

## 2020-12-23 ASSESSMENT — ENCOUNTER SYMPTOMS
HEMATOCHEZIA: 0
BRUISES/BLEEDS EASILY: 0
WEIGHT LOSS: 1
HEMOPTYSIS: 0
FEVER: 0
ALLERGIC/IMMUNOLOGIC COMMENTS: NO NEW FOOD ALLERGIES
COUGH: 0
WEIGHT GAIN: 0
SUSPICIOUS LESIONS: 0
CHILLS: 0

## 2020-12-23 ASSESSMENT — PATIENT HEALTH QUESTIONNAIRE - PHQ9
CLINICAL INTERPRETATION OF PHQ9 SCORE: NO FURTHER SCREENING NEEDED
2. FEELING DOWN, DEPRESSED OR HOPELESS: NOT AT ALL
SUM OF ALL RESPONSES TO PHQ9 QUESTIONS 1 AND 2: 0
1. LITTLE INTEREST OR PLEASURE IN DOING THINGS: NOT AT ALL
SUM OF ALL RESPONSES TO PHQ9 QUESTIONS 1 AND 2: 0
CLINICAL INTERPRETATION OF PHQ2 SCORE: NO FURTHER SCREENING NEEDED

## 2021-01-14 RX ORDER — WARFARIN SODIUM 5 MG/1
TABLET ORAL
Qty: 135 TABLET | Refills: 0 | Status: SHIPPED | OUTPATIENT
Start: 2021-01-14 | End: 2021-04-30

## 2021-01-19 ENCOUNTER — E-ADVICE (OUTPATIENT)
Dept: CARDIOLOGY | Age: 61
End: 2021-01-19

## 2021-01-19 NOTE — TELEPHONE ENCOUNTER
Requested Prescriptions     Pending Prescriptions Disp Refills   • METFORMIN  MG Oral Tab [Pharmacy Med Name: METFORMIN 500MG TABLETS] 180 tablet 0     Sig: TAKE 1 TABLET(500 MG) BY MOUTH TWICE DAILY WITH MEALS     LOV 9/9/2020     Patient was asked

## 2021-01-20 ENCOUNTER — TELEMEDICINE (OUTPATIENT)
Dept: FAMILY MEDICINE CLINIC | Facility: CLINIC | Age: 61
End: 2021-01-20

## 2021-01-20 DIAGNOSIS — M67.911 DISORDER OF RIGHT ROTATOR CUFF: Primary | ICD-10-CM

## 2021-01-20 PROCEDURE — 99213 OFFICE O/P EST LOW 20 MIN: CPT | Performed by: FAMILY MEDICINE

## 2021-01-20 NOTE — PROGRESS NOTES
Patient presents with:  Shoulder Pain: R    .Subjective     HPI:   Leonarda Lopez is a 61year old male who presents for shoulder pain  This visit is conducted using Telemedicine with live, interactive video and audio.     Shoulder pain - thinks triggered

## 2021-01-26 ENCOUNTER — OFFICE VISIT (OUTPATIENT)
Dept: PHYSICAL THERAPY | Facility: HOSPITAL | Age: 61
End: 2021-01-26
Attending: FAMILY MEDICINE
Payer: COMMERCIAL

## 2021-01-26 DIAGNOSIS — M67.911 DISORDER OF RIGHT ROTATOR CUFF: ICD-10-CM

## 2021-01-26 PROCEDURE — 97162 PT EVAL MOD COMPLEX 30 MIN: CPT

## 2021-01-26 PROCEDURE — 97110 THERAPEUTIC EXERCISES: CPT

## 2021-01-26 NOTE — PROGRESS NOTES
SHOULDER EVALUATION:   Referring Physician: Dr. Padmini Dowd  Diagnosis: Disorder of right rotator cuff (K17.572)     Date of Service: 1/26/2021     PATIENT SUMMARY   Edis Perdue is a 61year old male who presents to therapy today with complaints of R shoul symmetrically, no neuro complaints   Cervical Screen: wfl    AROM: (* denotes performed with pain)  Shoulder  Elbow   Flexion: R 120*; L 140  Abduction: R 110*; L 150  ER: R C7*; L T2  IR: R L1*; L T7 Flexion: R wnl; L wnl  Extension: R wnl; L Supination: involved/activity limitations, and evolving symptoms including changing pain levels and chronic in nature which may affect speed of improvement.   PLAN OF CARE:    Goals: (to be met in 12 visits)   · Pt will report improved ability to sleep without waking d for these services furnished under this plan of treatment and while under my care.     X___________________________________________________ Date____________________    Certification From: 3/76/7962  To:4/26/2021

## 2021-01-28 ENCOUNTER — OFFICE VISIT (OUTPATIENT)
Dept: PHYSICAL THERAPY | Facility: HOSPITAL | Age: 61
End: 2021-01-28
Attending: FAMILY MEDICINE
Payer: COMMERCIAL

## 2021-01-28 DIAGNOSIS — M67.911 DISORDER OF RIGHT ROTATOR CUFF: ICD-10-CM

## 2021-01-28 PROCEDURE — 97140 MANUAL THERAPY 1/> REGIONS: CPT

## 2021-01-28 PROCEDURE — 97110 THERAPEUTIC EXERCISES: CPT

## 2021-01-28 NOTE — PROGRESS NOTES
Dx: Disorder of right rotator cuff (E44.560)          Authorized # of Visits:  PPO         Next MD visit: none scheduled  Fall Risk: standard         Precautions: n/a             Subjective: Doing exs at home.   Initial evaluation: Current functional limita retraction    doorway stretch 20 sec x3    supine-  Foam roller B, alt to 90 degrees 0#, habd YTB 2x10 ea     L SL R ER 1# 15    L SL- R scaption 0# x15    Standing in front of mirror-  B UE overhead x10    PROM R shoulder all directions    Manual and verb

## 2021-01-30 ENCOUNTER — E-ADVICE (OUTPATIENT)
Dept: CARDIOLOGY | Age: 61
End: 2021-01-30

## 2021-02-01 ENCOUNTER — HOSPITAL ENCOUNTER (OUTPATIENT)
Dept: LAB | Facility: HOSPITAL | Age: 61
Discharge: HOME | End: 2021-02-01
Attending: INTERNAL MEDICINE
Payer: COMMERCIAL

## 2021-02-01 ENCOUNTER — ANTI-COAG (OUTPATIENT)
Dept: CARDIOLOGY | Age: 61
End: 2021-02-01

## 2021-02-01 DIAGNOSIS — I48.20 CHRONIC ATRIAL FIBRILLATION (CMD): ICD-10-CM

## 2021-02-01 DIAGNOSIS — Z79.01 LONG TERM (CURRENT) USE OF ANTICOAGULANTS: ICD-10-CM

## 2021-02-01 DIAGNOSIS — Z79.01 ANTICOAGULATED ON COUMADIN: Primary | ICD-10-CM

## 2021-02-01 LAB
INR PPP: 2.5
POC INR: 2.5 (ref 0.8–1.3)

## 2021-02-01 PROCEDURE — 85610 PROTHROMBIN TIME: CPT

## 2021-02-02 ENCOUNTER — OFFICE VISIT (OUTPATIENT)
Dept: PHYSICAL THERAPY | Facility: HOSPITAL | Age: 61
End: 2021-02-02
Attending: FAMILY MEDICINE
Payer: COMMERCIAL

## 2021-02-02 PROCEDURE — 97110 THERAPEUTIC EXERCISES: CPT

## 2021-02-02 PROCEDURE — 97140 MANUAL THERAPY 1/> REGIONS: CPT

## 2021-02-02 NOTE — PROGRESS NOTES
Dx: Disorder of right rotator cuff (Y67.343)          Authorized # of Visits:  PPO         Next MD visit: none scheduled  Fall Risk: standard         Precautions: n/a             Subjective: still having pain with reaching overhead  Initial evaluation: Cur [facility] joint mechanics and posture to assist with decreased pain with activities of daily living . Next visit: continued [facility] clair aldana and T-spine clair aldana    Date: 1/28/2021  Tx#: 2/12 Date: 2/2/2021   Tx#: 3/ Date: Tx#: 4/ Date: Tx#: 5/ Date:    Tx#: 6/ Toi trap: R 3-/5*; L 5/5   Lats: R 4/5*, L 5/5  Low trap: R 3-/5*; L 5/5   RHD but golfs righty    Special tests:     Subacromial Pain Syndrome:  Moser-Jaun Impingement Sign: R +  Neer Impingement Test: R +    Accessory motion: decreased R GH joint mobili

## 2021-02-03 ENCOUNTER — OFFICE VISIT (OUTPATIENT)
Dept: PHYSICAL THERAPY | Facility: HOSPITAL | Age: 61
End: 2021-02-03
Attending: FAMILY MEDICINE
Payer: COMMERCIAL

## 2021-02-03 DIAGNOSIS — Z23 NEED FOR VACCINATION: ICD-10-CM

## 2021-02-03 PROCEDURE — 97014 ELECTRIC STIMULATION THERAPY: CPT

## 2021-02-03 PROCEDURE — 97140 MANUAL THERAPY 1/> REGIONS: CPT

## 2021-02-03 PROCEDURE — 97110 THERAPEUTIC EXERCISES: CPT

## 2021-02-03 NOTE — PROGRESS NOTES
Dx: Disorder of right rotator cuff (N86.800)          Authorized # of Visits:  PPO         Next MD visit: none scheduled  Fall Risk: standard         Precautions: n/a             Subjective: shoulder feeling a little bit better.   Initial evaluation: Adan Ma be independent and compliant with comprehensive HEP to maintain progress achieved in PT       Plan: Continue plan of care as pt tolerates with focus on improving GH joint mechanics and posture to assist with decreased pain with activities of daily living . III-IV multiple bouts      Manual therapy:     STM posterior, lateral, anterior R GH  R GH jt katya-  Inf, AP, PA gr III-IV multiple bouts     CPA T3-6 gr III-IV multiple bouts     Skin lock clair aldana T3-6 Gr III-IV multiple bouts     scap katya    IFC R sh

## 2021-02-05 ENCOUNTER — IMMUNIZATION (OUTPATIENT)
Dept: LAB | Age: 61
End: 2021-02-05
Attending: HOSPITALIST
Payer: COMMERCIAL

## 2021-02-05 DIAGNOSIS — Z23 NEED FOR VACCINATION: Primary | ICD-10-CM

## 2021-02-05 PROCEDURE — 0001A SARSCOV2 VAC 30MCG/0.3ML IM: CPT

## 2021-02-08 ENCOUNTER — OFFICE VISIT (OUTPATIENT)
Dept: PHYSICAL THERAPY | Facility: HOSPITAL | Age: 61
End: 2021-02-08
Attending: FAMILY MEDICINE
Payer: COMMERCIAL

## 2021-02-08 PROCEDURE — 97140 MANUAL THERAPY 1/> REGIONS: CPT

## 2021-02-08 PROCEDURE — 97110 THERAPEUTIC EXERCISES: CPT

## 2021-02-08 PROCEDURE — 97014 ELECTRIC STIMULATION THERAPY: CPT

## 2021-02-08 NOTE — PROGRESS NOTES
Dx: Disorder of right rotator cuff (S61.648)          Authorized # of Visits:  PPO         Next MD visit: none scheduled  Fall Risk: standard         Precautions: n/a             Subjective: Felt pretty good after last visit but pain keeps coming back. independent and compliant with comprehensive HEP to maintain progress achieved in PT       Plan: Continue plan of care as pt tolerates with focus on improving GH joint mechanics and posture to assist with decreased pain with activities of daily living .  Ne posterior, lateral, anterior R GH  R GH jt mobes-  Inf, AP, PA gr II-III multiple bouts     CPA T3-6 gr III-IV multiple bouts     Skin lock jt mobjulisa T3-6 Gr III-IV multiple bouts  Manual therapy:     STM posterior, lateral, anterior R GH  R GH jt mobes-  I

## 2021-02-15 ENCOUNTER — OFFICE VISIT (OUTPATIENT)
Dept: PHYSICAL THERAPY | Facility: HOSPITAL | Age: 61
End: 2021-02-15
Attending: FAMILY MEDICINE
Payer: COMMERCIAL

## 2021-02-15 PROCEDURE — 97110 THERAPEUTIC EXERCISES: CPT

## 2021-02-15 PROCEDURE — 97140 MANUAL THERAPY 1/> REGIONS: CPT

## 2021-02-15 NOTE — PROGRESS NOTES
Dx: Disorder of right rotator cuff (U04.447)          Authorized # of Visits:  PPO         Next MD visit: none scheduled  Fall Risk: standard         Precautions: n/a             Subjective: Felt pretty good after last visit but pain keeps coming back.  Not mechanics and stabilization with lifting and reaching   · Pt will be independent and compliant with comprehensive HEP to maintain progress achieved in PT       Plan: Continue plan of care as pt tolerates with focus on improving GH joint mechanics and postu 2x10    Manual and verbal cueing with above    Posture ed     Ther Ex:    UBE 3'F/3'B  L 3-hills w lumbar and chin retraction      Roll ball up wall x10    Standing-  IR  ER  RTB 2x10     scap retr + row  15# R/L x20    L SL- R ER 2# 2x10    L SL-R scaptio Scapular   Flexion: R 4-/5*; L 5/5  Abduction: R 4-/5*; L 5/5  ER: R 4/5*; L 5/5  IR: R 4+/5*; L 5/5 Flexion: R 5/5; L 5/5  Extension: R 5/5; L 5/5  Supination: R 5/5; L 5/5  Pronation: R 5/5; L 5/5  Mid trap: R 3-/5*; L 5/5   Lats: R 4/5*, L 5/5  Low trap

## 2021-02-17 ENCOUNTER — OFFICE VISIT (OUTPATIENT)
Dept: PHYSICAL THERAPY | Facility: HOSPITAL | Age: 61
End: 2021-02-17
Attending: FAMILY MEDICINE
Payer: COMMERCIAL

## 2021-02-17 PROCEDURE — 97110 THERAPEUTIC EXERCISES: CPT

## 2021-02-17 PROCEDURE — 97140 MANUAL THERAPY 1/> REGIONS: CPT

## 2021-02-17 NOTE — PROGRESS NOTES
Dx: Disorder of right rotator cuff (L27.506)          Authorized # of Visits:  PPO         Next MD visit: none scheduled  Fall Risk: standard         Precautions: n/a             Subjective: Felt pretty good after last visit but pain keeps coming back and trap strength to 5-/5 to promote improved shoulder mechanics and stabilization with lifting and reaching   · Pt will be independent and compliant with comprehensive HEP to maintain progress achieved in PT       Plan: Continue plan of care as pt tolerates w shoulder all directions  x30 ea w distraction    Supine-  habd YTB 2x10    Manual and verbal cueing with above    Posture ed     Ther Ex:    UBE 3'F/3'B  L 3-hills w lumbar and chin retraction      Roll ball up wall x10    Standing-  IR  ER  RTB 2x10     s CPA T3-6 gr III-IV multiple bouts     Skin lock jt mobes  T3-6 Gr III-IV multiple bouts     scap mobes         Manual therapy:    STM posterior, lateral, anterior R GH  R GH jt mobes-  Inf, AP, PA gr III-IV multiple  bouts      scap mobes

## 2021-02-23 ENCOUNTER — PATIENT MESSAGE (OUTPATIENT)
Dept: FAMILY MEDICINE CLINIC | Facility: CLINIC | Age: 61
End: 2021-02-23

## 2021-02-23 ENCOUNTER — OFFICE VISIT (OUTPATIENT)
Dept: PHYSICAL THERAPY | Facility: HOSPITAL | Age: 61
End: 2021-02-23
Attending: FAMILY MEDICINE
Payer: COMMERCIAL

## 2021-02-23 DIAGNOSIS — M67.911 DISORDER OF RIGHT ROTATOR CUFF: Primary | ICD-10-CM

## 2021-02-23 PROCEDURE — 97110 THERAPEUTIC EXERCISES: CPT

## 2021-02-23 PROCEDURE — 97140 MANUAL THERAPY 1/> REGIONS: CPT

## 2021-02-23 NOTE — TELEPHONE ENCOUNTER
From: Chino Garzon  To: Shawnee Agrawal MD  Sent: 2/23/2021 8:54 AM CST  Subject: Other    PT is working to a point. However, it loosens up my shoulder but once I get away from the PT it goes back to the way it was and in some cases more painful.     Gary Hope

## 2021-02-23 NOTE — PROGRESS NOTES
Progress Summary  Pt has attended 8 visits in Physical Therapy.     Dx: Disorder of right rotator cuff (J96.371)          Authorized # of Visits:  PPO         Next MD visit: none scheduled  Fall Risk: standard         Precautions: n/a             Minesh Bernardino joint, + subacromial impingement testing and reduced function with activities of daily living . Pt responds well to joint mobes and R shoulder/scapular strengthening with decreased subacromial impingement end of session, but pain returns.  Pt has less pain education/training, scapular and core stabilization. Manual therapy to include: joint mobilizations to restore normal joint mechanics and decrease pain, instrument assisted soft tissue mobilization. Modalities: e-stim, heat/cold for pain relief. 2x10    TRX pull ups 2x10      L SL- R ER 2# 2x10    L SL-R scaption 1# 2x10    Prone-ext, habd, flex x10    PROM R shoulder all directions  x30 ea w distraction    PNF scap retraction and depressio-  retraining x30    Supine-  habd YTB 2x10    Standing in PA gr II-III multiple bouts     CPA T3-6 gr III-IV multiple bouts     Skin lock clair aldana T3-6 Gr III-IV multiple bouts  Manual therapy:     STM posterior, lateral, anterior R GH  R GH clair aldana-  Inf, AP, PA gr II-III multiple bouts     CPA T3-6 gr III-IV m

## 2021-02-23 NOTE — TELEPHONE ENCOUNTER
Telemedicine visit with Aroldo Laboy 1/20/2021 discussing R Rotator Cuff. Please see new [email or web address] message.  Requesting MRI  Routed to Aroldo Laboy

## 2021-02-25 ENCOUNTER — TELEPHONE (OUTPATIENT)
Dept: ORTHOPEDICS CLINIC | Facility: CLINIC | Age: 61
End: 2021-02-25

## 2021-02-25 ENCOUNTER — OFFICE VISIT (OUTPATIENT)
Dept: PHYSICAL THERAPY | Facility: HOSPITAL | Age: 61
End: 2021-02-25
Attending: FAMILY MEDICINE
Payer: COMMERCIAL

## 2021-02-25 DIAGNOSIS — M25.511 RIGHT SHOULDER PAIN, UNSPECIFIED CHRONICITY: Primary | ICD-10-CM

## 2021-02-25 PROCEDURE — 97140 MANUAL THERAPY 1/> REGIONS: CPT

## 2021-02-25 PROCEDURE — 97110 THERAPEUTIC EXERCISES: CPT

## 2021-02-25 NOTE — PROGRESS NOTES
Progress Summary  Pt has attended 9 visits in Physical Therapy.     Dx: Disorder of right rotator cuff (N86.630)          Authorized # of Visits:  PPO         Next MD visit: none scheduled  Fall Risk: standard         Precautions: n/a             Florian increased ROM, strength, and function with reaching outward. Pt continues to have increased pain all endranges R GH joint, + subacromial impingement testing and reduced function with activities of daily living .  Pt responds well to joint mobes and R should was advised of these findings, precautions, and treatment options and has agreed to actively participate in planning and for this course of care. Thank you for your referral. If you have any questions, please contact me at Dept: [phone].     Sincere in front of mirror-  B UE overhead x10    Manual and verbal cueing with above Ther Ex:     UBE 3'F/3'B  L 2-hills w lumbar and chin retraction    Roll ball up wall x10    Standing-  IR  ER  RTB 2x10    scap retr + row  15# R/L x20    L SL- R ER 2# 2x10 distraction    Wall lift offs RTB x10    Side wall walks RTB 5 laps    Posture ed       Manual therapy:     STM posterior, lateral, anterior R GH  R GH jt mobes-  Inf, AP, PA gr II-III multiple bouts     CPA T3-6 gr III-IV multiple bouts     Skin lock jt m

## 2021-02-25 NOTE — TELEPHONE ENCOUNTER
Patient has an appointment scheduled on 3/1 with Miriam Beltran for right shoulder issue. Will patient need imaging?

## 2021-02-26 ENCOUNTER — IMMUNIZATION (OUTPATIENT)
Dept: LAB | Age: 61
End: 2021-02-26
Attending: HOSPITALIST
Payer: COMMERCIAL

## 2021-02-26 DIAGNOSIS — Z23 NEED FOR VACCINATION: Primary | ICD-10-CM

## 2021-02-26 PROCEDURE — 0002A SARSCOV2 VAC 30MCG/0.3ML IM: CPT

## 2021-03-01 ENCOUNTER — HOSPITAL ENCOUNTER (OUTPATIENT)
Dept: GENERAL RADIOLOGY | Age: 61
Discharge: HOME | End: 2021-03-01
Attending: NURSE PRACTITIONER
Payer: COMMERCIAL

## 2021-03-01 ENCOUNTER — OFFICE VISIT (OUTPATIENT)
Dept: ORTHOPEDICS CLINIC | Facility: CLINIC | Age: 61
End: 2021-03-01
Payer: COMMERCIAL

## 2021-03-01 VITALS — OXYGEN SATURATION: 99 % | BODY MASS INDEX: 33 KG/M2 | RESPIRATION RATE: 16 BRPM | HEIGHT: 71 IN | HEART RATE: 60 BPM

## 2021-03-01 DIAGNOSIS — M75.51 SUBACROMIAL BURSITIS OF RIGHT SHOULDER JOINT: Primary | ICD-10-CM

## 2021-03-01 DIAGNOSIS — G89.29 CHRONIC RIGHT SHOULDER PAIN: ICD-10-CM

## 2021-03-01 DIAGNOSIS — M25.511 RIGHT SHOULDER PAIN, UNSPECIFIED CHRONICITY: ICD-10-CM

## 2021-03-01 DIAGNOSIS — M25.511 CHRONIC RIGHT SHOULDER PAIN: ICD-10-CM

## 2021-03-01 PROCEDURE — 20610 DRAIN/INJ JOINT/BURSA W/O US: CPT | Performed by: NURSE PRACTITIONER

## 2021-03-01 PROCEDURE — 99213 OFFICE O/P EST LOW 20 MIN: CPT | Performed by: NURSE PRACTITIONER

## 2021-03-01 PROCEDURE — 73030 X-RAY EXAM OF SHOULDER: CPT | Performed by: NURSE PRACTITIONER

## 2021-03-01 RX ORDER — TRIAMCINOLONE ACETONIDE 40 MG/ML
40 INJECTION, SUSPENSION INTRA-ARTICULAR; INTRAMUSCULAR ONCE
Status: COMPLETED | OUTPATIENT
Start: 2021-03-01 | End: 2021-03-01

## 2021-03-01 RX ADMIN — TRIAMCINOLONE ACETONIDE 40 MG: 40 INJECTION, SUSPENSION INTRA-ARTICULAR; INTRAMUSCULAR at 14:30:00

## 2021-03-01 NOTE — PROCEDURES
After informed consent, the patient's right shoulder was marked, locally anesthetized with skin refrigerant, prepped with topical antiseptic, and injected with a mixture of 1mL 40mg/mL Kenalog and 2mL 0.5% marcaine and 2mL of Lidocaine 1% into the subacrom

## 2021-03-01 NOTE — PROGRESS NOTES
[facility] New Patient Note    CC: Patient presents with:  Shoulder Pain: RIGHT SHOULDER PAIN 2 MONTHS     HPI: This 61year old male presents today with complaints of right shoulder pain for approximately 2 to 3 months.   He notes a time when he was • furosemide (LASIX) 20 MG Oral Tab Take 20 mg by mouth daily.            Radiology Contrast *    ANAPHYLAXIS    Comment:CLASS  Erythromycin            NAUSEA AND VOMITING, NAUSEA ONLY    Comment:derivatives             derivatives  Dust Radiographs were personally reviewed of the right shoulder that demonstrate no arthritic change of the glenohumeral joint and he does not have a high riding humeral head.   He does have a type II acromion    ASSESSMENT  Subacromial bursitis right shoulder w

## 2021-03-04 ENCOUNTER — PATIENT MESSAGE (OUTPATIENT)
Dept: ORTHOPEDICS CLINIC | Facility: CLINIC | Age: 61
End: 2021-03-04

## 2021-03-04 NOTE — TELEPHONE ENCOUNTER
From: Eusebio Morales  To: KANDIS Rock  Sent: 3/4/2021 10:40 AM CST  Subject: Visit Follow-up Question    [facility] [address] [place] [address] [address] on [facility]) I would like to allow him to see my x-rays.  H

## 2021-03-06 ENCOUNTER — APPOINTMENT (OUTPATIENT)
Dept: LAB | Facility: HOSPITAL | Age: 61
End: 2021-03-06
Attending: INTERNAL MEDICINE
Payer: COMMERCIAL

## 2021-03-06 ENCOUNTER — HOSPITAL ENCOUNTER (OUTPATIENT)
Dept: LAB | Facility: HOSPITAL | Age: 61
Discharge: HOME | End: 2021-03-06
Attending: INTERNAL MEDICINE
Payer: COMMERCIAL

## 2021-03-06 DIAGNOSIS — Z79.01 LONG TERM (CURRENT) USE OF ANTICOAGULANTS: ICD-10-CM

## 2021-03-06 LAB
INR PPP: 3
POC INR: 3 (ref 0.8–1.3)

## 2021-03-06 PROCEDURE — 85610 PROTHROMBIN TIME: CPT

## 2021-03-08 ENCOUNTER — ANTI-COAG (OUTPATIENT)
Dept: CARDIOLOGY | Age: 61
End: 2021-03-08

## 2021-03-08 ENCOUNTER — OFFICE VISIT (OUTPATIENT)
Dept: PHYSICAL THERAPY | Facility: HOSPITAL | Age: 61
End: 2021-03-08
Attending: NURSE PRACTITIONER
Payer: COMMERCIAL

## 2021-03-08 DIAGNOSIS — I48.20 CHRONIC ATRIAL FIBRILLATION (CMD): ICD-10-CM

## 2021-03-08 PROCEDURE — 97110 THERAPEUTIC EXERCISES: CPT

## 2021-03-08 PROCEDURE — 97140 MANUAL THERAPY 1/> REGIONS: CPT

## 2021-03-08 NOTE — PROGRESS NOTES
Dx: Subacromial bursitis of right shoulder joint (M75.51)Chronic right shoulder pain (M25.511,G89.29)           Authorized # of Visits:  PPO         Next MD visit: none scheduled  Fall Risk: standard         Precautions: n/a             Subjective: Pain: 0 without waking due to shoulder pain - NO DIFFICULTY WITH SLEEPING IF NOT SLEEPING ON RIGHT SHOULDER FOR TOO LONG  · Pt will improve shoulder flexion AROM to >/=140 degrees to be able to reach into overhead cabinets without pain or restriction-ALMOST MET directions x30 ea    Supine-  habd YTB 2x10    ER  IR  RTB x10 ea    Standing in front of mirror-  B UE overhead x10    Manual and verbal cueing with above Ther Ex:     UBE 3'F/3'B  L 2-hills w lumbar and chin retraction    Roll ball up wall x10    Standin directions  x30 ea  w distraction    Sleeper stretch 10 sec x10    Wall lift offs RTB x10    Side wall walks RTB 5 laps    spiderman R RTB x5 -3 directions    Standing-  Cane ext x10    Posture ed Ther Ex:     UBE 3'F/3'B  L 3-hills w lumbar and chin retra anterior R GH  R GH jt mobes-  Inf, AP, PA gr III-IV multiple  bouts      scap mobes Manual therapy:     STM posterior, lateral, anterior R GH  R GH jt mobes-  Inf, AP, PA gr III-IV multiple  bouts      scap mobes    CPA T3-6 gr III-IV multiple bouts     S

## 2021-03-09 RX ORDER — CARVEDILOL 25 MG/1
25 TABLET ORAL 2 TIMES DAILY
Qty: 180 TABLET | Refills: 1 | Status: SHIPPED | OUTPATIENT
Start: 2021-03-09

## 2021-03-10 ENCOUNTER — OFFICE VISIT (OUTPATIENT)
Dept: FAMILY MEDICINE CLINIC | Facility: CLINIC | Age: 61
End: 2021-03-10
Payer: COMMERCIAL

## 2021-03-10 VITALS
RESPIRATION RATE: 16 BRPM | OXYGEN SATURATION: 98 % | WEIGHT: 222 LBS | HEIGHT: 71 IN | HEART RATE: 60 BPM | SYSTOLIC BLOOD PRESSURE: 116 MMHG | BODY MASS INDEX: 31.08 KG/M2 | TEMPERATURE: 97 F | DIASTOLIC BLOOD PRESSURE: 60 MMHG

## 2021-03-10 DIAGNOSIS — E11.65 UNCONTROLLED TYPE 2 DIABETES MELLITUS WITH HYPERGLYCEMIA, WITHOUT LONG-TERM CURRENT USE OF INSULIN (HCC): ICD-10-CM

## 2021-03-10 DIAGNOSIS — N40.1 BPH ASSOCIATED WITH NOCTURIA: ICD-10-CM

## 2021-03-10 DIAGNOSIS — E78.5 HYPERLIPIDEMIA LDL GOAL <100: ICD-10-CM

## 2021-03-10 DIAGNOSIS — Z99.89 OSA ON CPAP: ICD-10-CM

## 2021-03-10 DIAGNOSIS — I10 ESSENTIAL HYPERTENSION: ICD-10-CM

## 2021-03-10 DIAGNOSIS — I83.11 VARICOSE VEINS OF RIGHT LOWER EXTREMITY WITH INFLAMMATION: ICD-10-CM

## 2021-03-10 DIAGNOSIS — Z00.00 ANNUAL PHYSICAL EXAM: Primary | ICD-10-CM

## 2021-03-10 DIAGNOSIS — I48.20 CHRONIC ATRIAL FIBRILLATION (HCC): ICD-10-CM

## 2021-03-10 DIAGNOSIS — J45.20 MILD INTERMITTENT ASTHMA WITHOUT COMPLICATION: ICD-10-CM

## 2021-03-10 DIAGNOSIS — G47.33 OSA ON CPAP: ICD-10-CM

## 2021-03-10 DIAGNOSIS — R35.1 BPH ASSOCIATED WITH NOCTURIA: ICD-10-CM

## 2021-03-10 LAB
CARTRIDGE LOT#: 742 NUMERIC
HEMOGLOBIN A1C: 9.3 % (ref 4.3–5.6)

## 2021-03-10 PROCEDURE — 83036 HEMOGLOBIN GLYCOSYLATED A1C: CPT | Performed by: FAMILY MEDICINE

## 2021-03-10 PROCEDURE — 3046F HEMOGLOBIN A1C LEVEL >9.0%: CPT | Performed by: FAMILY MEDICINE

## 2021-03-10 PROCEDURE — 3074F SYST BP LT 130 MM HG: CPT | Performed by: FAMILY MEDICINE

## 2021-03-10 PROCEDURE — 3008F BODY MASS INDEX DOCD: CPT | Performed by: FAMILY MEDICINE

## 2021-03-10 PROCEDURE — 99396 PREV VISIT EST AGE 40-64: CPT | Performed by: FAMILY MEDICINE

## 2021-03-10 PROCEDURE — 3078F DIAST BP <80 MM HG: CPT | Performed by: FAMILY MEDICINE

## 2021-03-10 RX ORDER — TAMSULOSIN HYDROCHLORIDE 0.4 MG/1
0.4 CAPSULE ORAL DAILY
Qty: 90 CAPSULE | Refills: 1 | Status: SHIPPED | OUTPATIENT
Start: 2021-03-10 | End: 2021-03-18

## 2021-03-10 NOTE — PROGRESS NOTES
Patient presents with:  Physical: Annual     HPI:   Anahi Rubin is a 61year old male who presents for a complete physical exam.     Last colonoscopy:  9/2018. Repeat 3 yrs - 2021  Last PSA:  1.37 in 2019. Urination up to 5 times a night.   Sometimes 1000 MG Oral Tab Take 1 tablet (1,000 mg total) by mouth 2 (two) times daily with meals. 180 tablet 1   • Azelastine HCl 0.1 % Nasal Solution 1 spray by Nasal route 2 (two) times daily.  1 Bottle 2   • Warfarin Sodium 5 MG Oral Tab Take 1 to 1 and 1/2 tabs systems reviewed, negative other than noted above. EXAM:   /60   Pulse 60   Temp 97.3 °F (36.3 °C) (Temporal)   Resp 16   Ht 5' 11\" (1.803 m)   Wt 222 lb (100.7 kg)   SpO2 98%   BMI 30.96 kg/m²   Body mass index is 30.96 kg/m².      General appear use of insulin (HCC)  After A1C 5.6 in summer, now significantly elevated  No idea how - he has not changes diet  Will increase metformin back to 1000mg BID  Will hold off on adding back sulfonylurea at this time.   Recheck in summer.   - metFORMIN HCl 1000

## 2021-03-11 ENCOUNTER — OFFICE VISIT (OUTPATIENT)
Dept: PHYSICAL THERAPY | Facility: HOSPITAL | Age: 61
End: 2021-03-11
Attending: NURSE PRACTITIONER
Payer: COMMERCIAL

## 2021-03-11 PROCEDURE — 97110 THERAPEUTIC EXERCISES: CPT

## 2021-03-11 PROCEDURE — 97140 MANUAL THERAPY 1/> REGIONS: CPT

## 2021-03-11 NOTE — PROGRESS NOTES
Dx: Subacromial bursitis of right shoulder joint (M75.51)Chronic right shoulder pain (M25.511,G89.29)           Authorized # of Visits:  PPO         Next MD visit: none scheduled  Fall Risk: standard         Precautions: n/a             Subjective: Pain: 0 waking due to shoulder pain - NO DIFFICULTY WITH SLEEPING IF NOT SLEEPING ON RIGHT SHOULDER FOR TOO LONG  · Pt will improve shoulder flexion AROM to >/=140 degrees to be able to reach into overhead cabinets without pain or restriction-ALMOST MET   · Pt baltazar x30 ea    Supine-  habd YTB 2x10    ER  IR  RTB x10 ea    Standing in front of mirror-  B UE overhead x10    Manual and verbal cueing with above Ther Ex:     UBE 3'F/3'B  L 2-hills w lumbar and chin retraction    Roll ball up wall x10    Standing-  IR  ER distraction    Sleeper stretch 10 sec x10    Wall lift offs RTB x10    Side wall walks RTB 5 laps    spiderman R RTB x5 -3 directions    Standing-  Cane ext x10    Posture ed Ther Ex:     UBE 3'F/3'B  L 3-hills w lumbar and chin retraction    Standing-  IR mobes-  Inf, AP, PA gr III-IV multiple  bouts      scap mobes Manual therapy:     STM posterior, lateral, anterior R GH  R GH jt mobes-  Inf, AP, PA gr III-IV multiple  bouts      scap mobes    CPA T3-6 gr III-IV multiple bouts     Skin lock jt mobes  T3-6

## 2021-03-15 ENCOUNTER — OFFICE VISIT (OUTPATIENT)
Dept: PHYSICAL THERAPY | Facility: HOSPITAL | Age: 61
End: 2021-03-15
Attending: NURSE PRACTITIONER
Payer: COMMERCIAL

## 2021-03-15 PROCEDURE — 97110 THERAPEUTIC EXERCISES: CPT

## 2021-03-15 PROCEDURE — 97140 MANUAL THERAPY 1/> REGIONS: CPT

## 2021-03-15 NOTE — PROGRESS NOTES
Dx: Subacromial bursitis of right shoulder joint (M75.51)Chronic right shoulder pain (M25.511,G89.29)           Authorized # of Visits:  PPO         Next MD visit: none scheduled  Fall Risk: standard         Precautions: n/a             Subjective: Pain: 0 incorporated.           Goals:   Goals: (to be met in 12 visits) -  · Pt will report improved ability to sleep without waking due to shoulder pain - NO DIFFICULTY WITH SLEEPING IF NOT SLEEPING ON RIGHT SHOULDER FOR TOO LONG  · Pt will improve shoulder flexi lumbar and chin retraction    Roll ball up wall x10    L SL- R ER 2# 2x10    L SL-R scaption 1# 2x10    Prone-ext, habd, flex x10    PROM R shoulder all directions x30 ea    Supine-  habd YTB 2x10    ER  IR  RTB x10 ea    Standing in front of mirror-  B UE retraction        Standing-  IR  ER  BTB 2x10     scap retr + row precor  20# R/L x20    L SL- R ER 2# 1x10    L SL-R scaption  1# 1x10    PROM R shoulder all directions  x30 ea  w distraction    Sleeper stretch 10 sec x10    Wall lift offs RTB x10    Side posterior, lateral, anterior R GH  R GH jt mobes-  Inf, AP, PA gr III-IV multiple  bouts      scap mobes         Manual therapy:     STM posterior, lateral, anterior R GH  R GH jt mobes-  Inf, AP, PA gr III-IV multiple  bouts      scap mobes Manual therapy Scapular mobes R    STM/IASTM R RC/R shoulder-including posterior GH/triceps    jt mobes R GH joint gr III-IV multiple bouts all dir and in limited flexion positions           Charges: Chip 2, MTx2   Total Timed Treatment: 60 min     Total Treatment Bryant Rojas

## 2021-03-16 ENCOUNTER — PATIENT MESSAGE (OUTPATIENT)
Dept: FAMILY MEDICINE CLINIC | Facility: CLINIC | Age: 61
End: 2021-03-16

## 2021-03-16 DIAGNOSIS — R35.1 BPH ASSOCIATED WITH NOCTURIA: Primary | ICD-10-CM

## 2021-03-16 DIAGNOSIS — N40.1 BPH ASSOCIATED WITH NOCTURIA: Primary | ICD-10-CM

## 2021-03-16 NOTE — TELEPHONE ENCOUNTER
From: Saturnino Escamilla  To: Patricia Serrano MD  Sent: 3/16/2021 4:54 PM CDT  Subject: Visit Follow-up Question    Since adding prostate medication and doubling metformin I am nauseated and dizzy. Just to also be sure, had covid test today.  Wife is negative

## 2021-03-17 ENCOUNTER — OFFICE VISIT (OUTPATIENT)
Dept: PHYSICAL THERAPY | Facility: HOSPITAL | Age: 61
End: 2021-03-17
Attending: NURSE PRACTITIONER
Payer: COMMERCIAL

## 2021-03-17 PROCEDURE — 97110 THERAPEUTIC EXERCISES: CPT

## 2021-03-17 PROCEDURE — 97140 MANUAL THERAPY 1/> REGIONS: CPT

## 2021-03-17 NOTE — PROGRESS NOTES
Dx: Subacromial bursitis of right shoulder joint (M75.51)Chronic right shoulder pain (M25.511,G89.29)           Authorized # of Visits:  PPO         Next MD visit: none scheduled  Fall Risk: standard         Precautions: n/a             Subjective: Pain: 0 and KT to support R GH joint. Postural corrections with treatment focused on attempting to correct scapular dyskinesis with reaching in all directions.      Goals:   Goals: (to be met in 12 visits) -  · Pt will report improved ability to sleep without wakin mirror-  B UE overhead x10    PROM R shoulder all directions    Manual and verbal cueing with above Ther Ex:     UBE 3'F/3'B  L 2-hills w lumbar and chin retraction    Roll ball up wall x10    L SL- R ER 2# 2x10    L SL-R scaption 1# 2x10    Prone-ext, hab resisted scap retraction+depression  x20    Posture ed Ther Ex:     Progress note    UBE 3'F/3'B  L 3-hills w lumbar and chin retraction        Standing-  IR  ER  BTB 2x10     scap retr + row precor  20# R/L x20    L SL- R ER 2# 1x10    L SL-R scaption  1# III-IV multiple bouts     Skin lock jt mobes  T3-6 Gr III-IV multiple bouts     scap mobes         Manual therapy:    STM posterior, lateral, anterior R GH  R GH jt mobes-  Inf, AP, PA gr III-IV multiple  bouts      scap mobes         Manual therapy:     S laps GTB     Simulated golf swings- suggestions    Sitting-  Mid thoracic ext over ball, rolled towel x20 ea    precor scap retraction + row 2x15    doorway stretch 20 sec x3    scap retraction 10 sec x10    Posture ed throughout    Standing i

## 2021-03-18 RX ORDER — FINASTERIDE 5 MG/1
5 TABLET, FILM COATED ORAL DAILY
Qty: 90 TABLET | Refills: 1 | Status: ON HOLD | OUTPATIENT
Start: 2021-03-18 | End: 2021-03-24

## 2021-03-21 ENCOUNTER — E-ADVICE (OUTPATIENT)
Dept: CARDIOLOGY | Age: 61
End: 2021-03-21

## 2021-03-22 ENCOUNTER — APPOINTMENT (OUTPATIENT)
Dept: GENERAL RADIOLOGY | Facility: HOSPITAL | Age: 61
DRG: 312 | End: 2021-03-22
Attending: EMERGENCY MEDICINE
Payer: COMMERCIAL

## 2021-03-22 ENCOUNTER — APPOINTMENT (OUTPATIENT)
Dept: PHYSICAL THERAPY | Facility: HOSPITAL | Age: 61
End: 2021-03-22
Attending: NURSE PRACTITIONER
Payer: COMMERCIAL

## 2021-03-22 ENCOUNTER — HOSPITAL ENCOUNTER (INPATIENT)
Facility: HOSPITAL | Age: 61
LOS: 2 days | Discharge: HOME | DRG: 312 | End: 2021-03-24
Attending: EMERGENCY MEDICINE | Admitting: HOSPITALIST
Payer: COMMERCIAL

## 2021-03-22 ENCOUNTER — APPOINTMENT (OUTPATIENT)
Dept: CT IMAGING | Facility: HOSPITAL | Age: 61
DRG: 312 | End: 2021-03-22
Attending: EMERGENCY MEDICINE
Payer: COMMERCIAL

## 2021-03-22 DIAGNOSIS — R55 SYNCOPE, NEAR: ICD-10-CM

## 2021-03-22 DIAGNOSIS — I95.9 HYPOTENSION, UNSPECIFIED HYPOTENSION TYPE: Primary | ICD-10-CM

## 2021-03-22 DIAGNOSIS — E87.1 HYPONATREMIA: ICD-10-CM

## 2021-03-22 DIAGNOSIS — R10.13 EPIGASTRIC PAIN: ICD-10-CM

## 2021-03-22 PROBLEM — R79.89 AZOTEMIA: Status: ACTIVE | Noted: 2021-03-22

## 2021-03-22 LAB
ALBUMIN SERPL-MCNC: 3.8 G/DL (ref 3.4–5)
ALBUMIN/GLOB SERPL: 1 {RATIO} (ref 1–2)
ALP LIVER SERPL-CCNC: 89 U/L
ALT SERPL-CCNC: 35 U/L
ANION GAP SERPL CALC-SCNC: 10 MMOL/L (ref 0–18)
ANTIBODY SCREEN: NEGATIVE
APTT PPP: 44.9 SECONDS (ref 25.4–36.1)
AST SERPL-CCNC: 15 U/L (ref 15–37)
ATRIAL RATE: 46 BPM
BASOPHILS # BLD AUTO: 0.04 X10(3) UL (ref 0–0.2)
BASOPHILS NFR BLD AUTO: 0.3 %
BILIRUB SERPL-MCNC: 1.4 MG/DL (ref 0.1–2)
BILIRUB UR QL STRIP.AUTO: NEGATIVE
BUN BLD-MCNC: 29 MG/DL (ref 7–18)
BUN/CREAT SERPL: 25.2 (ref 10–20)
CALCIUM BLD-MCNC: 9.2 MG/DL (ref 8.5–10.1)
CHLORIDE SERPL-SCNC: 93 MMOL/L (ref 98–112)
CO2 SERPL-SCNC: 24 MMOL/L (ref 21–32)
COLOR UR AUTO: YELLOW
CORTIS SERPL-MCNC: 38.8 UG/DL
CREAT BLD-MCNC: 1.15 MG/DL
D-DIMER: <0.27 UG/ML FEU (ref ?–0.6)
DEPRECATED RDW RBC AUTO: 35.3 FL (ref 35.1–46.3)
DIGOXIN SERPL-MCNC: 1.07 NG/ML (ref 0.8–2)
EOSINOPHIL # BLD AUTO: 0.16 X10(3) UL (ref 0–0.7)
EOSINOPHIL NFR BLD AUTO: 1.3 %
ERYTHROCYTE [DISTWIDTH] IN BLOOD BY AUTOMATED COUNT: 11.3 % (ref 11–15)
GLOBULIN PLAS-MCNC: 3.7 G/DL (ref 2.8–4.4)
GLUCOSE BLD-MCNC: 153 MG/DL (ref 70–99)
GLUCOSE BLD-MCNC: 161 MG/DL (ref 70–99)
GLUCOSE BLD-MCNC: 177 MG/DL (ref 70–99)
GLUCOSE BLD-MCNC: 208 MG/DL (ref 70–99)
GLUCOSE UR STRIP.AUTO-MCNC: NEGATIVE MG/DL
HCT VFR BLD AUTO: 42.9 %
HGB BLD-MCNC: 15.7 G/DL
IMM GRANULOCYTES # BLD AUTO: 0.05 X10(3) UL (ref 0–1)
IMM GRANULOCYTES NFR BLD: 0.4 %
INR BLD: 3.91 (ref 0.89–1.11)
LACTATE SERPL-SCNC: 1.4 MMOL/L (ref 0.4–2)
LEUKOCYTE ESTERASE UR QL STRIP.AUTO: NEGATIVE
LIPASE SERPL-CCNC: 279 U/L (ref 73–393)
LYMPHOCYTES # BLD AUTO: 2.38 X10(3) UL (ref 1–4)
LYMPHOCYTES NFR BLD AUTO: 19.3 %
M PROTEIN MFR SERPL ELPH: 7.5 G/DL (ref 6.4–8.2)
MCH RBC QN AUTO: 31.7 PG (ref 26–34)
MCHC RBC AUTO-ENTMCNC: 36.6 G/DL (ref 31–37)
MCV RBC AUTO: 86.5 FL
MONOCYTES # BLD AUTO: 1 X10(3) UL (ref 0.1–1)
MONOCYTES NFR BLD AUTO: 8.1 %
NEUTROPHILS # BLD AUTO: 8.73 X10 (3) UL (ref 1.5–7.7)
NEUTROPHILS # BLD AUTO: 8.73 X10(3) UL (ref 1.5–7.7)
NEUTROPHILS NFR BLD AUTO: 70.6 %
NITRITE UR QL STRIP.AUTO: NEGATIVE
OSMOLALITY SERPL CALC.SUM OF ELEC: 274 MOSM/KG (ref 275–295)
OSMOLALITY UR: 241 MOSM/KG (ref 300–1300)
PH UR STRIP.AUTO: 5 [PH] (ref 5–8)
PLATELET # BLD AUTO: 275 10(3)UL (ref 150–450)
POTASSIUM SERPL-SCNC: 4.1 MMOL/L (ref 3.5–5.1)
PROT UR STRIP.AUTO-MCNC: NEGATIVE MG/DL
PSA SERPL DL<=0.01 NG/ML-MCNC: 39.2 SECONDS (ref 12.4–14.6)
Q-T INTERVAL: 340 MS
QRS DURATION: 110 MS
QTC CALCULATION (BEZET): 415 MS
R AXIS: 41 DEGREES
RBC # BLD AUTO: 4.96 X10(6)UL
RBC UR QL AUTO: NEGATIVE
RH BLOOD TYPE: POSITIVE
SARS-COV-2 RNA RESP QL NAA+PROBE: NOT DETECTED
SODIUM SERPL-SCNC: 127 MMOL/L (ref 136–145)
SP GR UR STRIP.AUTO: 1.01 (ref 1–1.03)
T AXIS: 25 DEGREES
TROPONIN I SERPL-MCNC: <0.045 NG/ML (ref ?–0.04)
UROBILINOGEN UR STRIP.AUTO-MCNC: <2 MG/DL
VENTRICULAR RATE: 90 BPM
WBC # BLD AUTO: 12.4 X10(3) UL (ref 4–11)

## 2021-03-22 PROCEDURE — 71045 X-RAY EXAM CHEST 1 VIEW: CPT | Performed by: EMERGENCY MEDICINE

## 2021-03-22 PROCEDURE — 99223 1ST HOSP IP/OBS HIGH 75: CPT | Performed by: HOSPITALIST

## 2021-03-22 PROCEDURE — 99253 IP/OBS CNSLTJ NEW/EST LOW 45: CPT | Performed by: INTERNAL MEDICINE

## 2021-03-22 PROCEDURE — 74176 CT ABD & PELVIS W/O CONTRAST: CPT | Performed by: EMERGENCY MEDICINE

## 2021-03-22 RX ORDER — SODIUM CHLORIDE 9 MG/ML
INJECTION, SOLUTION INTRAVENOUS CONTINUOUS
Status: ACTIVE | OUTPATIENT
Start: 2021-03-22 | End: 2021-03-22

## 2021-03-22 RX ORDER — ATORVASTATIN CALCIUM 40 MG/1
40 TABLET, FILM COATED ORAL NIGHTLY
Status: DISCONTINUED | OUTPATIENT
Start: 2021-03-22 | End: 2021-03-24

## 2021-03-22 RX ORDER — DEXTROSE MONOHYDRATE 25 G/50ML
50 INJECTION, SOLUTION INTRAVENOUS
Status: DISCONTINUED | OUTPATIENT
Start: 2021-03-22 | End: 2021-03-24

## 2021-03-22 RX ORDER — FINASTERIDE 5 MG/1
5 TABLET, FILM COATED ORAL DAILY
Status: DISCONTINUED | OUTPATIENT
Start: 2021-03-22 | End: 2021-03-22

## 2021-03-22 RX ORDER — ACETAMINOPHEN 325 MG/1
650 TABLET ORAL EVERY 6 HOURS PRN
Status: DISCONTINUED | OUTPATIENT
Start: 2021-03-22 | End: 2021-03-24

## 2021-03-22 RX ORDER — DIGOXIN 125 MCG
125 TABLET ORAL DAILY
Status: DISCONTINUED | OUTPATIENT
Start: 2021-03-22 | End: 2021-03-24

## 2021-03-22 RX ORDER — SODIUM CHLORIDE 9 MG/ML
INJECTION, SOLUTION INTRAVENOUS CONTINUOUS
Status: DISCONTINUED | OUTPATIENT
Start: 2021-03-22 | End: 2021-03-24

## 2021-03-22 RX ORDER — ONDANSETRON 2 MG/ML
4 INJECTION INTRAMUSCULAR; INTRAVENOUS EVERY 6 HOURS PRN
Status: DISCONTINUED | OUTPATIENT
Start: 2021-03-22 | End: 2021-03-24

## 2021-03-22 RX ORDER — WARFARIN SODIUM 5 MG/1
5 TABLET ORAL SEE ADMIN INSTRUCTIONS
Status: ON HOLD | COMMUNITY
End: 2021-03-24

## 2021-03-22 RX ORDER — CARVEDILOL 6.25 MG/1
6.25 TABLET ORAL 2 TIMES DAILY WITH MEALS
Status: DISCONTINUED | OUTPATIENT
Start: 2021-03-22 | End: 2021-03-23

## 2021-03-22 NOTE — RESPIRATORY THERAPY NOTE
Patient has ANTOINETTE and does wear cpap sometimes at home but refuses to wear during hospital stay. Will monitor patient with pulse ox at night.

## 2021-03-22 NOTE — CONSULTS
[facility]    PATIENT'S NAME: Kami Heller   ATTENDING PHYSICIAN: Varun Sal M.D.   [facility]: Caterina Mabry M.D.    PATIENT ACCOUNT#:   [de-identified]    LOCATION:  [address] [place] [address]  MEDICAL RECORD #:   [id number]       DATE OF BIRTH: Today, with recurrent lightheadedness and feeling nauseous throughout. No black stools, bloody stools, fevers, chills. The rest of the review of systems is negative x10 systems.       PHYSICAL EXAMINATION:    VITAL SIGNS:  Current vital signs 97.1, 80, 15

## 2021-03-22 NOTE — ED NOTES
Pt status unchanged. Pt voided per urinal. Additional urine sent to lab for osmolality at this time. Pt aware he will be going to floor shortly, awaiting transport.

## 2021-03-22 NOTE — ED PROVIDER NOTES
Patient Seen in: [facility] Emergency Department      History   Patient presents with:  Dizziness    Stated Complaint: dizziness     HPI/Subjective:   HPI    Patient is a very pleasant 60-year-old male with a history of A. fib on Coumadin who presen 11\")   Wt 96.7 kg   SpO2 100%   BMI 29.72 kg/m²         Physical Exam    General: Patient is somewhat fatigued appearing 70-year-old male  HEENT: Normal cephalic atraumatic. Nonicteric sclera. Moist mucous membranes. No meningismus.   No adenopathy  Benita (LANOXIN) - Normal   LACTIC ACID, PLASMA - Normal   LIPASE - Normal   RAPID SARS-COV-2 BY PCR - Normal   CBC WITH DIFFERENTIAL WITH PLATELET    Narrative: The following orders were created for panel order CBC WITH DIFFERENTIAL WITH PLATELET.   Procedure 80.  Nontoxic-appearing. EKG with A. fib though without specific ST-T wave changes. Will check troponin. Will check blood work. Will hydrate. Will check Covid test.  Will check coags. Contacted digoxin level. Will check urine/blood cultures.   Admiss

## 2021-03-22 NOTE — ED NOTES
Pt reports he is feeling a little better. Per Dr. Parminder Langley, ok to complete liter of saline at this time.

## 2021-03-22 NOTE — ED INITIAL ASSESSMENT (HPI)
Pt presents to the ED with complaints of feeling dizzy and lightheaded since Thursday. Pt has hx of afib. Pt also c/o feeling epigastric tightness and fatigue, particularly with exertion. Pt reports poor appetite, things not tasting well.  Pt reports starti

## 2021-03-22 NOTE — H&P
[facility]  History and Physical     Esaw Fossa Patient Status:  Emergency    1960 MRN [id number]   Location [facility] Attending Delphine Parikh MD   Hosp Day # 0 PCP Byron Padilla MD     Chief Complain facility-administered medications on file prior to encounter. metFORMIN HCl 1000 MG Oral Tab, Take 1 tablet (1,000 mg total) by mouth 2 (two) times daily with meals. , Disp: 180 tablet, Rfl: 1  Azelastine HCl 0.1 % Nasal Solution, 1 spray by Nasal route 2 rebound, guarding or organomegaly. Neurologic: No focal neurological deficits. CNII-XII grossly intact. Musculoskeletal: Moves all extremities. Extremities: No edema or cyanosis. Integument: No rashes or lesions.    Psychiatric: Appropriate mood and aff

## 2021-03-22 NOTE — ED NOTES
Report given to [facility] and the [facility], RN. Sandra [facility] aware pt in need of additional urine for osmolality. Transport contacted.

## 2021-03-22 NOTE — CONSULTS
Cardiology Consult Note     PRIMARY CARDIOLOGIST: Dr. Jessie Anderson: Near syncope, hypotension and known chronic atrial fibrillation, hypertension      HISTORY: 40-year-old gentleman with known chronic underlying atrial fibrillation hypertension h

## 2021-03-22 NOTE — ED NOTES
Pt awake and alert, skin w/d,resps reg/unlabored. Pt appears comfortable. Pharmacy at bedside for med reconciliation.

## 2021-03-22 NOTE — PROGRESS NOTES
Orthostatic BPs.     Patient reports feeling \"off\"       03/22/21 1321 03/22/21 1323 03/22/21 1325   Vital Signs   Pulse 60 62 102   Heart Rate Source Monitor Monitor Monitor   Resp 18 20 18   Respiratory Quality Normal Normal Normal   /65 97/72 97/

## 2021-03-22 NOTE — PROGRESS NOTES
Rec'd patient from ED. Assumed care at ~1320. Alert and oriented. Vital signs stable. Atrial fibrillation on telemetry. Denies pain. Orthostatic BPs positive. Fall precautions in place. Skin checked with RAYSA Kiser-intact.   Patient admitted via Ca

## 2021-03-23 ENCOUNTER — APPOINTMENT (OUTPATIENT)
Dept: CV DIAGNOSTICS | Facility: HOSPITAL | Age: 61
DRG: 312 | End: 2021-03-23
Attending: INTERNAL MEDICINE
Payer: COMMERCIAL

## 2021-03-23 LAB
ANION GAP SERPL CALC-SCNC: 7 MMOL/L (ref 0–18)
BUN BLD-MCNC: 18 MG/DL (ref 7–18)
BUN/CREAT SERPL: 29 (ref 10–20)
CALCIUM BLD-MCNC: 9 MG/DL (ref 8.5–10.1)
CHLORIDE SERPL-SCNC: 104 MMOL/L (ref 98–112)
CHOLEST SMN-MCNC: 132 MG/DL (ref ?–200)
CO2 SERPL-SCNC: 25 MMOL/L (ref 21–32)
CREAT BLD-MCNC: 0.62 MG/DL
GLUCOSE BLD-MCNC: 133 MG/DL (ref 70–99)
GLUCOSE BLD-MCNC: 145 MG/DL (ref 70–99)
GLUCOSE BLD-MCNC: 155 MG/DL (ref 70–99)
GLUCOSE BLD-MCNC: 168 MG/DL (ref 70–99)
GLUCOSE BLD-MCNC: 223 MG/DL (ref 70–99)
GLUCOSE BLD-MCNC: 307 MG/DL (ref 70–99)
HDLC SERPL-MCNC: 38 MG/DL (ref 40–59)
INR BLD: 3.8 (ref 0.89–1.11)
LDLC SERPL CALC-MCNC: 74 MG/DL (ref ?–100)
NONHDLC SERPL-MCNC: 94 MG/DL (ref ?–130)
OSMOLALITY SERPL CALC.SUM OF ELEC: 286 MOSM/KG (ref 275–295)
POTASSIUM SERPL-SCNC: 3.6 MMOL/L (ref 3.5–5.1)
POTASSIUM SERPL-SCNC: 4.6 MMOL/L (ref 3.5–5.1)
PSA SERPL DL<=0.01 NG/ML-MCNC: 38.3 SECONDS (ref 12.4–14.6)
SODIUM SERPL-SCNC: 136 MMOL/L (ref 136–145)
TRIGL SERPL-MCNC: 98 MG/DL (ref 30–149)
TROPONIN I SERPL-MCNC: <0.045 NG/ML (ref ?–0.04)
VLDLC SERPL CALC-MCNC: 20 MG/DL (ref 0–30)

## 2021-03-23 PROCEDURE — 93306 TTE W/DOPPLER COMPLETE: CPT | Performed by: INTERNAL MEDICINE

## 2021-03-23 PROCEDURE — 99232 SBSQ HOSP IP/OBS MODERATE 35: CPT | Performed by: HOSPITALIST

## 2021-03-23 PROCEDURE — 99233 SBSQ HOSP IP/OBS HIGH 50: CPT | Performed by: INTERNAL MEDICINE

## 2021-03-23 RX ORDER — SODIUM CHLORIDE 9 MG/ML
INJECTION, SOLUTION INTRAVENOUS CONTINUOUS
Status: ACTIVE | OUTPATIENT
Start: 2021-03-23 | End: 2021-03-24

## 2021-03-23 RX ORDER — CARVEDILOL 12.5 MG/1
12.5 TABLET ORAL 2 TIMES DAILY WITH MEALS
Status: DISCONTINUED | OUTPATIENT
Start: 2021-03-23 | End: 2021-03-24

## 2021-03-23 RX ORDER — POTASSIUM CHLORIDE 20 MEQ/1
40 TABLET, EXTENDED RELEASE ORAL EVERY 4 HOURS
Status: COMPLETED | OUTPATIENT
Start: 2021-03-23 | End: 2021-03-23

## 2021-03-23 NOTE — PLAN OF CARE
Assumed pt care at 0730. A&Ox4, glasses. RA, denies chest pain/SOB, lung sounds clear, VSS, afib on tele. Voids. Up with SBA. POC echo this am, IVF, monitor BP, POC updated with pt, questions answered, verbalized understanding.      1200: gave report to Aim

## 2021-03-23 NOTE — PLAN OF CARE
Alert and oriented x4. On RA. Denies any further chest pain or SOB. A fib on tele, rates controlled. Continent to bowel and bladder. Denies pain. Up with SBA. QID accucheck. Call light within reach.

## 2021-03-23 NOTE — PROGRESS NOTES
[facility]  Cardiology Progress Note    Subjective:  No chest pain or shortness of breath. Still with afib w/ RVR. Rates 100 - 140's this morning sitting in chair.   Cannot sense afib for the most part, but does admit he thinks he has episodes of essie effort. Abdomen: Soft, non-tender. Extremities: Without clubbing, cyanosis or edema. Peripheral pulses are 2+. Skin: Warm and dry.      • Potassium Chloride ER  40 mEq Oral Q4H   • dilTIAZem BOLUS FROM BAG  5 mg Intravenous Once   • atorvastatin  40 mg elevated due to poor oral intake.  - Dr. Sha Tran to review later today for further recommendations    Eli Franco, APRN  3/23/2021  9:14 AM    Addendum:  HR and b/p have improved with IVF. Will uptitrate to Coreg 12.5mg PO BID tonight.   If tolerates well,

## 2021-03-23 NOTE — PAYOR COMM NOTE
--------------  ADMISSION REVIEW     Payor: MICHAEL EPSTEIN  Subscriber #:  [id number]  Authorization Number: [id number]    Admit date: 3/22/21  Admit time:  1:11 PM       Patient Seen in: [facility] Emergency Department    History   Patient presents with: BUN/CREA Ratio 25.2 (*)     Calculated Osmolality 274 (*)     All other components within normal limits   PROTHROMBIN TIME (PT) - Abnormal; Notable for the following components:    PT 39.2 (*)     INR 3.91 (*)     All other components within normal limits Dizziness    History of Present Illness: Gigi Beltran is a 61year old male with past medical history of htn, dm2, hl, afib, bph presents to ED with complaints of dizziness for two weeks after starting his prostate medication.   He states he has been fee lock. 3. HTN  4. DM2  5. HL  6. Afib  7. BPH    Quality:  · DVT Prophylaxis: Lovenox        3/23/21  Cardiology Progress Note     Subjective:  No chest pain or shortness of breath. Still with afib w/ RVR. Rates 100 - 140's this morning sitting in chair. bruits. Cardiac: S1 S2 irregular, 2/6 AURELIO  Lungs: Clear with normal excursion and effort. Abdomen: Soft, non-tender. Extremities: Without clubbing, cyanosis or edema. Peripheral pulses are 2+.   Skin: Warm and dry.       • Potassium Chloride ER  40 mEq

## 2021-03-23 NOTE — DIETARY NOTE
[facility]     Admitting diagnosis:  Hyponatremia [E87.1]  Epigastric pain [R10.13]  Syncope, near [R55]  Hypotension, unspecified hypotension type [I95.9]    Ht: 180.3 cm (5' 11\")  Wt: 96.7 kg (213 lb 1.6 oz). issues arise.     Alec Cardona RDN  Clinical Dietitian  Pager- [phone] Mmxikzh- [phone]

## 2021-03-23 NOTE — PLAN OF CARE
Received patient at 1200. Alert and orientated x4. Tele Rhythm Afib. O2 saturation 97% on RA. Breath sounds clear. No C/O chest pain. Ortho (+) this AM. Pt voiding with no issues. Skin is dry and intact. Echo completed this AM. 0.9NS at 125mL/Hr.  Bed is lo

## 2021-03-23 NOTE — PROGRESS NOTES
[facility]  Progress Note     Quincy Valdovinos Patient Status:  Inpatient    1960 MRN [id number]   [facility] 2NE-A Attending Chiqui Vides MD   Hosp Day # 1 PCP Chris Navarrete MD     Chief Complaint: Weakness    S: Patient f reviewed in Epic.     Medications:   • Potassium Chloride ER  40 mEq Oral Q4H   • dilTIAZem BOLUS FROM BAG  5 mg Intravenous Once   • atorvastatin  40 mg Oral Nightly   • digoxin  125 mcg Oral Daily   • carvedilol  6.25 mg Oral BID with meals   • Insulin As

## 2021-03-23 NOTE — PROGRESS NOTES
03/23/21 0509 03/23/21 0511 03/23/21 0513   Vital Signs   /72 100/69 (!) 80/58   MAP (mmHg) 81 77 64   BP Location Right arm Right arm Right arm   BP Method Automatic Automatic Automatic   Patient Position Lying Sitting Standing

## 2021-03-24 ENCOUNTER — APPOINTMENT (OUTPATIENT)
Dept: PHYSICAL THERAPY | Facility: HOSPITAL | Age: 61
End: 2021-03-24
Attending: NURSE PRACTITIONER
Payer: COMMERCIAL

## 2021-03-24 VITALS
OXYGEN SATURATION: 97 % | BODY MASS INDEX: 29.84 KG/M2 | TEMPERATURE: 98 F | HEIGHT: 71 IN | RESPIRATION RATE: 18 BRPM | DIASTOLIC BLOOD PRESSURE: 96 MMHG | SYSTOLIC BLOOD PRESSURE: 128 MMHG | WEIGHT: 213.13 LBS | HEART RATE: 91 BPM

## 2021-03-24 LAB
ANION GAP SERPL CALC-SCNC: 5 MMOL/L (ref 0–18)
ATRIAL RATE: 150 BPM
BASOPHILS # BLD AUTO: 0.02 X10(3) UL (ref 0–0.2)
BASOPHILS NFR BLD AUTO: 0.3 %
BUN BLD-MCNC: 14 MG/DL (ref 7–18)
BUN/CREAT SERPL: 20.6 (ref 10–20)
CALCIUM BLD-MCNC: 8.8 MG/DL (ref 8.5–10.1)
CHLORIDE SERPL-SCNC: 108 MMOL/L (ref 98–112)
CO2 SERPL-SCNC: 22 MMOL/L (ref 21–32)
CREAT BLD-MCNC: 0.68 MG/DL
DEPRECATED RDW RBC AUTO: 36.8 FL (ref 35.1–46.3)
EOSINOPHIL # BLD AUTO: 0.14 X10(3) UL (ref 0–0.7)
EOSINOPHIL NFR BLD AUTO: 2.2 %
ERYTHROCYTE [DISTWIDTH] IN BLOOD BY AUTOMATED COUNT: 11.5 % (ref 11–15)
GLUCOSE BLD-MCNC: 128 MG/DL (ref 70–99)
GLUCOSE BLD-MCNC: 136 MG/DL (ref 70–99)
GLUCOSE BLD-MCNC: 181 MG/DL (ref 70–99)
GLUCOSE BLD-MCNC: 252 MG/DL (ref 70–99)
HCT VFR BLD AUTO: 37.2 %
HGB BLD-MCNC: 13.6 G/DL
IMM GRANULOCYTES # BLD AUTO: 0.03 X10(3) UL (ref 0–1)
IMM GRANULOCYTES NFR BLD: 0.5 %
INR BLD: 3.25 (ref 0.89–1.11)
LYMPHOCYTES # BLD AUTO: 1.65 X10(3) UL (ref 1–4)
LYMPHOCYTES NFR BLD AUTO: 25.4 %
MCH RBC QN AUTO: 32.2 PG (ref 26–34)
MCHC RBC AUTO-ENTMCNC: 36.6 G/DL (ref 31–37)
MCV RBC AUTO: 87.9 FL
MONOCYTES # BLD AUTO: 0.62 X10(3) UL (ref 0.1–1)
MONOCYTES NFR BLD AUTO: 9.6 %
NEUTROPHILS # BLD AUTO: 4.03 X10 (3) UL (ref 1.5–7.7)
NEUTROPHILS # BLD AUTO: 4.03 X10(3) UL (ref 1.5–7.7)
NEUTROPHILS NFR BLD AUTO: 62 %
OSMOLALITY SERPL CALC.SUM OF ELEC: 283 MOSM/KG (ref 275–295)
PLATELET # BLD AUTO: 213 10(3)UL (ref 150–450)
POTASSIUM SERPL-SCNC: 4.2 MMOL/L (ref 3.5–5.1)
PSA SERPL DL<=0.01 NG/ML-MCNC: 33.9 SECONDS (ref 12.4–14.6)
Q-T INTERVAL: 344 MS
QRS DURATION: 96 MS
QTC CALCULATION (BEZET): 427 MS
R AXIS: 49 DEGREES
RBC # BLD AUTO: 4.23 X10(6)UL
SODIUM SERPL-SCNC: 135 MMOL/L (ref 136–145)
T AXIS: 18 DEGREES
VENTRICULAR RATE: 93 BPM
WBC # BLD AUTO: 6.5 X10(3) UL (ref 4–11)

## 2021-03-24 PROCEDURE — 99239 HOSP IP/OBS DSCHRG MGMT >30: CPT | Performed by: INTERNAL MEDICINE

## 2021-03-24 PROCEDURE — 99233 SBSQ HOSP IP/OBS HIGH 50: CPT | Performed by: INTERNAL MEDICINE

## 2021-03-24 RX ORDER — SODIUM CHLORIDE 9 MG/ML
INJECTION, SOLUTION INTRAVENOUS ONCE
Status: COMPLETED | OUTPATIENT
Start: 2021-03-24 | End: 2021-03-24

## 2021-03-24 RX ORDER — CARVEDILOL 12.5 MG/1
12.5 TABLET ORAL 2 TIMES DAILY WITH MEALS
Qty: 60 TABLET | Refills: 2 | Status: SHIPPED | OUTPATIENT
Start: 2021-03-24 | End: 2021-08-30 | Stop reason: DRUGHIGH

## 2021-03-24 NOTE — PLAN OF CARE
Alert and oriented x4. On RA. Denies any SOB or chest pain. A fib on tele, rates controlled. Continent to bowel and bladder. Denies pain. SBA. Call light within reach. Bed alarm on. QID accucheck.      Problem: CARDIOVASCULAR - ADULT  Goal: Maintains optima

## 2021-03-24 NOTE — PROGRESS NOTES
03/24/21 1608 03/24/21 1610 03/24/21 1612   Vital Signs   /87 126/80 118/70   BP Location Right arm Right arm Right arm   BP Method Automatic Automatic Automatic   Patient Position Lying Sitting Standing

## 2021-03-24 NOTE — DISCHARGE SUMMARY
[facility]  Discharge Summary    Quincy Valdovinos Patient Status:  Inpatient    1960 MRN [id number]   [facility] 2NE-A Attending Milli Euceda MD   [facility] Day # 2 PCP Chris Navarrete MD     Date of Admission: 3/22/2021    Date of D below.    03/24/21 0503 03/24/21 0505 03/24/21 0507   Vital Signs   /80 127/74 98/69   MAP (mmHg) 89 87 75   BP Location Right arm Right arm Right arm   BP Method Automatic Automatic Automatic   Patient Position Lying Sitting Standing        Patient also.  Home Coumadin was held in the hospital due to supratherapeutic INR, INR persist to be supratherapeutic today, cardiologist plans to hold off on the Coumadin today and advised the patient follow-up with cardiology Coumadin clinic on Friday, Coumadin as: ASTELIN      1 spray by Nasal route 2 (two) times daily. Quantity: 1 Bottle  Refills: 2     digoxin 0.125 MG Tabs  Commonly known as: LANOXIN      Take 125 mcg by mouth daily.    Refills: 0     metFORMIN HCl 1000 MG Tabs  Commonly known as: GLUCOPHAGE UP VISIT [2828] 30 min [facility] - Orthoped[facility] KANDIS Campos    Patient Instructions:                           Physical Exam:  /70 (BP Location: Right arm)   Pulse 80   Temp 98.2 °F (36.8 °C) (Oral)   Resp 18   Ht 5' 11

## 2021-03-24 NOTE — PROGRESS NOTES
[facility]  Progress Note     Ronak Sousa Patient Status:  Inpatient    1960 MRN [id number]   [facility] 2NE-A Attending Jenni Alva MD   Hosp Day # 2 PCP Kath Ariza MD     Chief Complaint: Weakness    S: Patient f --  87.9   .0  --  213.0   INR 3.91* 3.80* 3.25*       Recent Labs   Lab 03/22/21  0934 03/22/21  0934 03/23/21  0606 03/23/21  1654 03/24/21  0721   *  --  145*  --  136*   BUN 29*  --  18  --  14   CREATSERUM 1.15  --  0.62*  --  0.68*   GF supratherapeutic  3. Cardiology on consult  6. BPH  1. Home finasteride held in hospital due to orthostatic hypotension  7. Hyperlipidemia  1.  Statin      Plan of care: As above    Quality:  · DVT Prophylaxis: Coumadin on hold as INR supratherapeutic  · CO

## 2021-03-24 NOTE — PROGRESS NOTES
[facility]  Cardiology Progress Note    Subjective:  Patient sitting on side of bed upon assessment, states he did not sleep well last night. He still reports some \"fogginess\" when he stands up, which quickly resolves.  Upon exam, patient is in AF 9 Right atrium: The atrium was mildly dilated. Impressions:  This study is compared with previous dated 08/30/2018:   Compared to the prior study, there has been no significant interval change.        Assessment:  · Orthostatic hypotension with near synco on going home. As he is doing better, we will send him home. He will go home on a low-dose of Coreg. We will hold his lisinopril and diuretic. He will also hold his Coumadin tonight. I have asked him to get his INR checked on Friday.   He will follow-u

## 2021-03-24 NOTE — PAYOR COMM NOTE
--------------  CONTINUED STAY REVIEW    Payor: [facility]  Subscriber #:  [id number]  Authorization Number: [id number]    Admit date: 3/22/21  Admit time:  1:11 PM    Admitting Physician: Braulio Maldonado MD  Attending Physician:  Katie Acosta MD  Primary diagnostic evidence for regional wall motion abnormalities.      The study is not technically sufficient to allow evaluation of LV      diastolic function. 2. Aorta: Aortic root was 4.1cm at the sinus of Valsalva.    3. Left atrium: The left atrial volume patient appears dehydrated and orthostatic.     NABEEL Dyer  3/24/2021  9:19 AM          MEDICATIONS ADMINISTERED IN LAST 1 DAY:  atorvastatin (LIPITOR) tab 40 mg     Date Action Dose Route User    3/23/2021 1955 Given 40 mg Oral Kenton Mckeon, RN

## 2021-03-24 NOTE — PROGRESS NOTES
03/24/21 0503 03/24/21 0505 03/24/21 0507   Vital Signs   /80 127/74 98/69   MAP (mmHg) 89 87 75   BP Location Right arm Right arm Right arm   BP Method Automatic Automatic Automatic   Patient Position Lying Sitting Standing

## 2021-03-24 NOTE — PLAN OF CARE
Assumed pt care at 0730. A&Ox4, glasses. RA, denies chest pain/SOB, lung sounds clear, VSS, afib on tele. Voids. Up with SBA. POC IVF-- another liter given today, monitor BP, ortho BP, discharge today?  POC updated with pt, questions answered, verbalized un

## 2021-03-25 ENCOUNTER — ANTI-COAG (OUTPATIENT)
Dept: CARDIOLOGY | Age: 61
End: 2021-03-25

## 2021-03-25 ENCOUNTER — TELEPHONE (OUTPATIENT)
Dept: CARDIOLOGY | Age: 61
End: 2021-03-25

## 2021-03-25 ENCOUNTER — HOSPITAL ENCOUNTER (OUTPATIENT)
Dept: LAB | Facility: HOSPITAL | Age: 61
Discharge: HOME | End: 2021-03-25
Attending: INTERNAL MEDICINE
Payer: COMMERCIAL

## 2021-03-25 ENCOUNTER — PATIENT OUTREACH (OUTPATIENT)
Dept: CASE MANAGEMENT | Age: 61
End: 2021-03-25

## 2021-03-25 DIAGNOSIS — I48.20 CHRONIC ATRIAL FIBRILLATION (CMD): ICD-10-CM

## 2021-03-25 DIAGNOSIS — Z02.9 ENCOUNTERS FOR UNSPECIFIED ADMINISTRATIVE PURPOSE: ICD-10-CM

## 2021-03-25 DIAGNOSIS — I95.1 ORTHOSTATIC HYPOTENSION: ICD-10-CM

## 2021-03-25 DIAGNOSIS — Z79.01 LONG TERM (CURRENT) USE OF ANTICOAGULANTS: ICD-10-CM

## 2021-03-25 LAB
INR PPP: 1.9
POC INR: 1.9 (ref 0.8–1.3)

## 2021-03-25 PROCEDURE — 85610 PROTHROMBIN TIME: CPT

## 2021-03-25 NOTE — PROGRESS NOTES
Explained discharge instructions including medications and follow-ups to the patient, verbalized understanding, IV removed, tele monitor discontinued, will be transported via wheelchair.

## 2021-03-25 NOTE — PROGRESS NOTES
Initial Post Discharge Follow Up   Discharge Date: 3/24/21  Contact Date: 3/25/2021    Consent Verification:  Assessment Completed With: Patient  HIPAA Verified? Yes    Discharge Dx:  1. Dizziness and near syncope due to orthostatic hypotension  2.  Hyp 108 (90 Base) MCG/ACT Inhalation Aero Soln Inhale 2 puffs into the lungs every 4 (four) hours as needed for Wheezing. 1 Inhaler 2   • Atorvastatin Calcium (LIPITOR) 40 MG Oral Tab Take 40 mg by mouth daily.      • digoxin (LANOXIN) 0.125 MG Oral Tab Take 12 [address] [facility]  [phone]          PCP TCM/HFU appointment: scheduled at D/C within 7-14 days  no     NCM Reviewed/scheduled/rescheduled PCP TCM/HFU appointment with pt:  Yes, NCM scheduled TCM HFU i

## 2021-03-25 NOTE — PAYOR COMM NOTE
Payor:  [facility]  Subscriber #:  [id number]  Authorization Number: [id number]    Admit date: 3/22/21  Admit time:   1:11 PM  Discharge Date: 3/24/2021  7:24 PM

## 2021-03-29 ENCOUNTER — OFFICE VISIT (OUTPATIENT)
Dept: PHYSICAL THERAPY | Facility: HOSPITAL | Age: 61
End: 2021-03-29
Attending: NURSE PRACTITIONER
Payer: COMMERCIAL

## 2021-03-29 ENCOUNTER — OFFICE VISIT (OUTPATIENT)
Dept: INTERNAL MEDICINE CLINIC | Facility: CLINIC | Age: 61
End: 2021-03-29
Payer: COMMERCIAL

## 2021-03-29 VITALS
WEIGHT: 215 LBS | BODY MASS INDEX: 30.1 KG/M2 | DIASTOLIC BLOOD PRESSURE: 95 MMHG | OXYGEN SATURATION: 96 % | HEIGHT: 71 IN | TEMPERATURE: 98 F | SYSTOLIC BLOOD PRESSURE: 142 MMHG | RESPIRATION RATE: 18 BRPM | HEART RATE: 111 BPM

## 2021-03-29 DIAGNOSIS — I95.1 ORTHOSTATIC HYPOTENSION: ICD-10-CM

## 2021-03-29 DIAGNOSIS — J30.2 SEASONAL ALLERGIC RHINITIS: ICD-10-CM

## 2021-03-29 DIAGNOSIS — I48.20 CHRONIC ATRIAL FIBRILLATION (HCC): ICD-10-CM

## 2021-03-29 DIAGNOSIS — R55 SYNCOPE, NEAR: ICD-10-CM

## 2021-03-29 DIAGNOSIS — E11.65 UNCONTROLLED TYPE 2 DIABETES MELLITUS WITH HYPERGLYCEMIA (HCC): ICD-10-CM

## 2021-03-29 DIAGNOSIS — E87.1 HYPONATREMIA: Primary | ICD-10-CM

## 2021-03-29 LAB
ANION GAP SERPL CALC-SCNC: 6 MMOL/L (ref 0–18)
BUN BLD-MCNC: 14 MG/DL (ref 7–18)
BUN/CREAT SERPL: 16.3 (ref 10–20)
CALCIUM BLD-MCNC: 9 MG/DL (ref 8.5–10.1)
CHLORIDE SERPL-SCNC: 108 MMOL/L (ref 98–112)
CO2 SERPL-SCNC: 24 MMOL/L (ref 21–32)
CREAT BLD-MCNC: 0.86 MG/DL
GLUCOSE BLD-MCNC: 123 MG/DL (ref 70–99)
OSMOLALITY SERPL CALC.SUM OF ELEC: 288 MOSM/KG (ref 275–295)
PATIENT FASTING Y/N/NP: NO
POTASSIUM SERPL-SCNC: 3.7 MMOL/L (ref 3.5–5.1)
SODIUM SERPL-SCNC: 138 MMOL/L (ref 136–145)

## 2021-03-29 PROCEDURE — 99495 TRANSJ CARE MGMT MOD F2F 14D: CPT | Performed by: NURSE PRACTITIONER

## 2021-03-29 PROCEDURE — 97110 THERAPEUTIC EXERCISES: CPT

## 2021-03-29 PROCEDURE — 80048 BASIC METABOLIC PNL TOTAL CA: CPT | Performed by: NURSE PRACTITIONER

## 2021-03-29 PROCEDURE — 3008F BODY MASS INDEX DOCD: CPT | Performed by: NURSE PRACTITIONER

## 2021-03-29 PROCEDURE — 97140 MANUAL THERAPY 1/> REGIONS: CPT

## 2021-03-29 PROCEDURE — 3080F DIAST BP >= 90 MM HG: CPT | Performed by: NURSE PRACTITIONER

## 2021-03-29 PROCEDURE — 3077F SYST BP >= 140 MM HG: CPT | Performed by: NURSE PRACTITIONER

## 2021-03-29 RX ORDER — AZELASTINE 1 MG/ML
SPRAY, METERED NASAL
Qty: 30 ML | Refills: 0 | Status: SHIPPED | OUTPATIENT
Start: 2021-03-29 | End: 2021-03-29 | Stop reason: CLARIF

## 2021-03-29 RX ORDER — WARFARIN SODIUM 5 MG/1
5 TABLET ORAL NIGHTLY
COMMUNITY

## 2021-03-29 RX ORDER — AZELASTINE 1 MG/ML
1 SPRAY, METERED NASAL 2 TIMES DAILY PRN
COMMUNITY

## 2021-03-29 NOTE — PROGRESS NOTES
Dx: Subacromial bursitis of right shoulder joint (M75.51)Chronic right shoulder pain (M25.511,G89.29)           Authorized # of Visits:  PPO         Next MD visit: none scheduled  Fall Risk: standard         Precautions: n/a             Subjective: Pain: 0 directions celia flexion and avoiding increased R scapular elevation.       Goals:   Goals: (to be met in 12 visits) -  · Pt will report improved ability to sleep without waking due to shoulder pain - NO DIFFICULTY WITH SLEEPING IF NOT SLEEPING ON RIGHT SHOUL with above Ther Ex:     UBE 3'F/3'B  L 2-hills w lumbar and chin retraction    Roll ball up wall x10    L SL- R ER 2# 2x10    L SL-R scaption 1# 2x10    Prone-ext, habd, flex x10    PROM R shoulder all directions x30 ea    Supine-  habd YTB 2x10    ER  IR 3'F/3'B  L 3-hills w lumbar and chin retraction        Standing-  IR  ER  BTB 2x10     scap retr + row precor  20# R/L x20    L SL- R ER 2# 1x10    L SL-R scaption  1# 1x10    PROM R shoulder all directions  x30 ea  w distraction    Sleeper stretch 10 sec mobes         Manual therapy:    STM posterior, lateral, anterior R GH  R GH jt mobes-  Inf, AP, PA gr III-IV multiple  bouts      scap mobes         Manual therapy:     STM posterior, lateral, anterior R GH  R GH jt mobes-  Inf, AP, PA gr III-IV multiple suggestions    Sitting-  Mid thoracic ext over ball, rolled towel x20 ea    precor scap retraction + row 2x15    doorway stretch 20 sec x3    scap retraction 10 sec x10    Posture ed throughout    Standing in front of mirror-  B shoulder flex-manual and ve

## 2021-03-29 NOTE — PATIENT INSTRUCTIONS
1. Follow up with Cardiology - Eris Whaley on April 7, at 2:00pm      [address] [facility], [phone]        · Continue cardiac medications as advised by cardiology and follow up with them as scheduled above

## 2021-03-29 NOTE — PROGRESS NOTES
TRANSITIONAL CARE CLINIC PHARMACIST MEDICATION RECONCILIATION        Hortencia Sue MRN [id number]    1960 PCP Moe Yan MD       Comments: Medication history completed by the [address] [place] [address] Pharmacist with the patient in the [facility] No questions or concerns. Reviewed all medications in detail with patient including dose, indication, timing of administration, monitoring parameters, and potential side effects of medications. Patient confirmed understanding.      Thank you,    Heather Bower

## 2021-03-29 NOTE — TELEPHONE ENCOUNTER
Requested Prescriptions     Pending Prescriptions Disp Refills   • AZELASTINE HCL 0.1 % Nasal Solution [Pharmacy Med Name: AZELASTINE 0.1%(137MCG) NASAL-200SP] 30 mL 0     Sig: USE 1 SPRAY IN EACH NOSTRIL TWICE DAILY     LOV 3/10/2021     Patient was asked

## 2021-03-29 NOTE — PROGRESS NOTES
Per Tamie Lundy, pt's Na is 138 and WNL    Called pt and informed him or provider advice. Patient verbalized understanding and had no further questions.

## 2021-03-30 NOTE — PROGRESS NOTES
Govind Reynolds 6      HISTORY   CHIEF COMPLAINT: Dizziness with near syncope, hyponatremia, DMII, A-fib with RVR, and BPH.     HPI: Dottie Taylor is a 61year old male here today for hospital follow up for dizziness w meals., Disp: 180 tablet, Rfl: 1  Albuterol Sulfate HFA (PROAIR HFA) 108 (90 Base) MCG/ACT Inhalation Aero Soln, Inhale 2 puffs into the lungs every 4 (four) hours as needed for Wheezing., Disp: 1 Inhaler, Rfl: 2  Atorvastatin Calcium (LIPITOR) 40 MG Oral 3/22/2021 at 10:51 AM     Finalized by (CST): Rod Gross DO on 3/22/2021 at 10:51 AM         Lab Results   Component Value Date/Time    WBC 6.5 03/24/2021 07:21 AM    HGB 13.6 03/24/2021 07:21 AM    HCT 37.2 (L) 03/24/2021 07:21 AM    .0 03/24/20 developed, well nourished, in no apparent distress, good historian  INTEGUMENTARY: warm, dry  HEENT: atraumatic, normocephalic, sclera white, conjunctivae pink,   NECK: supple,  CHEST: no chest tenderness  LUNGS: clear to auscultation bilaterally, no wheez 7.5mg daily Monday-Saturday, and 5mg on Sunday.           Medications & Refills for this Visit:  Azelastine HCl 0.1 % Nasal Solution, 1 spray by Nasal route 2 (two) times daily as needed for Rhinitis., Disp: , Rfl:   Warfarin Sodium 5 MG Oral Tab, Take 5 mg assumption/resumption of care  ? Education given to patient on self-management, independent living, and activities of daily living. ?  Referrals as listed below in orders    Medication Reconciliation:  I am aware of an inpatient discharge within the last 3

## 2021-03-31 ENCOUNTER — OFFICE VISIT (OUTPATIENT)
Dept: PHYSICAL THERAPY | Facility: HOSPITAL | Age: 61
End: 2021-03-31
Attending: NURSE PRACTITIONER
Payer: COMMERCIAL

## 2021-03-31 PROCEDURE — 97140 MANUAL THERAPY 1/> REGIONS: CPT

## 2021-03-31 PROCEDURE — 97110 THERAPEUTIC EXERCISES: CPT

## 2021-03-31 NOTE — PROGRESS NOTES
Dx: Subacromial bursitis of right shoulder joint (M75.51)Chronic right shoulder pain (M25.511,G89.29)           Authorized # of Visits:  PPO         Next MD visit: none scheduled  Fall Risk: standard         Precautions: n/a             Subjective: Pain: 0 slide ex. Pt able to perform full golf swing without pain endragnes at end of treatment.  Postural corrections with treatment focused on attempting to correct scapular dyskinesis with reaching in all directions celia flexion and avoiding increased R scapular x3    supine-  Foam roller B, alt to 90 degrees 0#, habd YTB 2x10 ea       L SL R ER 1# 15     L SL- R scaption 0# x15    Standing in front of mirror-  B UE overhead x10    PROM R shoulder all directions    Manual and verbal cueing with above Ther Ex: 2x10    PROM R shoulder all directions  x30 ea  w distraction    TRX pull up x15    Supine-  habd BTB 2x10    Prone on ball-  habd 2# x10    PNF resisted scap retraction+depression  x20    Posture ed Ther Ex:     Progress note    UBE 3'F/3'B  L 3-hills w l high  w   x10 min   Manual therapy:     STM posterior, lateral, anterior R GH  R GH jt mobes-  Inf, AP, PA gr III-IV multiple  bouts     CPA T3-6 gr III-IV multiple bouts     Skin lock jt mobes  T3-6 Gr III-IV multiple bouts     scap mobjulisa         Manual t x10    Simulated golf swings- suggestions    Posture ed    KT-R shoulder supportive     Ther Ex:     UBE 3'F/3'B hills  L 2 w lumbar roll    PROM R shoulder all dir    Supine -  Thoracic ext over foam roller x30    spiderman x5 laps GTB     Si katya R GH joint gr III-IV multiple bouts  Manual therapy:     Scapular melaniees    STM/IASTM R RC/R shoulde/pec minor-  including posterior GH/triceps    jt katya R GH joint gr III-IV multiple bouts      Manual therapy:     Scapular melaniees    STM/IASTM R RC/R

## 2021-04-21 ENCOUNTER — OFFICE VISIT (OUTPATIENT)
Dept: ORTHOPEDICS CLINIC | Facility: CLINIC | Age: 61
End: 2021-04-21
Payer: COMMERCIAL

## 2021-04-21 VITALS — OXYGEN SATURATION: 99 % | HEART RATE: 65 BPM

## 2021-04-21 DIAGNOSIS — G89.29 CHRONIC RIGHT SHOULDER PAIN: ICD-10-CM

## 2021-04-21 DIAGNOSIS — M25.511 CHRONIC RIGHT SHOULDER PAIN: ICD-10-CM

## 2021-04-21 DIAGNOSIS — M75.51 SUBACROMIAL BURSITIS OF RIGHT SHOULDER JOINT: Primary | ICD-10-CM

## 2021-04-21 PROCEDURE — 99213 OFFICE O/P EST LOW 20 MIN: CPT | Performed by: NURSE PRACTITIONER

## 2021-04-21 NOTE — PROGRESS NOTES
[facility] Progress Note    CC: Patient presents with: Follow - Up: right shoulder pain    HPI: This 61year old male presents today for follow-up on his right shoulder and overall he states he is doing much better but not quite 100%.   He was workin EXAM  On physical exam age-appropriate 70-year-old male looks stated age no acute distress is alert and oriented x3.   On evaluation of his right shoulder shows he can forward elevate and abduct to approximately 140 degrees which is much better than he was

## 2021-04-30 ENCOUNTER — TELEPHONE (OUTPATIENT)
Dept: CARDIOLOGY | Age: 61
End: 2021-04-30

## 2021-04-30 RX ORDER — WARFARIN SODIUM 5 MG/1
TABLET ORAL
Qty: 135 TABLET | Refills: 0 | Status: SHIPPED | OUTPATIENT
Start: 2021-04-30 | End: 2021-06-03

## 2021-05-07 ENCOUNTER — ANTI-COAG (OUTPATIENT)
Dept: CARDIOLOGY | Age: 61
End: 2021-05-07

## 2021-05-07 DIAGNOSIS — I48.20 CHRONIC ATRIAL FIBRILLATION (CMD): ICD-10-CM

## 2021-05-14 ENCOUNTER — ANTI-COAG (OUTPATIENT)
Dept: CARDIOLOGY | Age: 61
End: 2021-05-14

## 2021-05-14 DIAGNOSIS — I48.20 CHRONIC ATRIAL FIBRILLATION (CMD): ICD-10-CM

## 2021-05-15 ENCOUNTER — HOSPITAL ENCOUNTER (OUTPATIENT)
Dept: LAB | Facility: HOSPITAL | Age: 61
Discharge: HOME | End: 2021-05-15
Attending: INTERNAL MEDICINE
Payer: COMMERCIAL

## 2021-05-15 DIAGNOSIS — Z79.01 LONG TERM (CURRENT) USE OF ANTICOAGULANTS: ICD-10-CM

## 2021-05-15 LAB — INR PPP: 2.9

## 2021-05-15 PROCEDURE — 85610 PROTHROMBIN TIME: CPT

## 2021-05-17 ENCOUNTER — ANTI-COAG (OUTPATIENT)
Dept: CARDIOLOGY | Age: 61
End: 2021-05-17

## 2021-05-17 DIAGNOSIS — I48.20 CHRONIC ATRIAL FIBRILLATION (CMD): ICD-10-CM

## 2021-06-03 RX ORDER — WARFARIN SODIUM 5 MG/1
TABLET ORAL
Qty: 135 TABLET | Refills: 0 | Status: SHIPPED | OUTPATIENT
Start: 2021-06-03

## 2021-06-11 ENCOUNTER — HOSPITAL ENCOUNTER (OUTPATIENT)
Dept: LAB | Facility: HOSPITAL | Age: 61
Discharge: HOME | End: 2021-06-11
Attending: INTERNAL MEDICINE
Payer: COMMERCIAL

## 2021-06-11 ENCOUNTER — TELEPHONE (OUTPATIENT)
Dept: CARDIOLOGY | Age: 61
End: 2021-06-11

## 2021-06-11 ENCOUNTER — ANTI-COAG (OUTPATIENT)
Dept: CARDIOLOGY | Age: 61
End: 2021-06-11

## 2021-06-11 DIAGNOSIS — Z79.01 LONG TERM (CURRENT) USE OF ANTICOAGULANTS: ICD-10-CM

## 2021-06-11 DIAGNOSIS — I48.20 CHRONIC ATRIAL FIBRILLATION (CMD): ICD-10-CM

## 2021-06-11 LAB — INR PPP: 2.4

## 2021-06-11 PROCEDURE — 85610 PROTHROMBIN TIME: CPT

## 2021-07-12 ENCOUNTER — TELEPHONE (OUTPATIENT)
Dept: CARDIOLOGY | Age: 61
End: 2021-07-12

## 2021-07-19 ENCOUNTER — ANTI-COAG (OUTPATIENT)
Dept: CARDIOLOGY | Age: 61
End: 2021-07-19

## 2021-07-19 DIAGNOSIS — I48.20 CHRONIC ATRIAL FIBRILLATION (CMD): Primary | ICD-10-CM

## 2021-08-20 DIAGNOSIS — N40.1 BPH ASSOCIATED WITH NOCTURIA: ICD-10-CM

## 2021-08-20 DIAGNOSIS — R35.1 BPH ASSOCIATED WITH NOCTURIA: ICD-10-CM

## 2021-08-23 NOTE — TELEPHONE ENCOUNTER
Refill request for:    Requested Prescriptions     Pending Prescriptions Disp Refills   • tamsulosin (FLOMAX) cap [Pharmacy Med Name: TAMSULOSIN 0.4MG CAPSULES] 90 capsule 1     Sig: TAKE 1 CAPSULE(0.4 MG) BY MOUTH DAILY        Last Prescribed Quantity Ref

## 2021-08-25 DIAGNOSIS — R35.1 BPH ASSOCIATED WITH NOCTURIA: ICD-10-CM

## 2021-08-25 DIAGNOSIS — N40.1 BPH ASSOCIATED WITH NOCTURIA: ICD-10-CM

## 2021-08-26 ENCOUNTER — HOSPITAL ENCOUNTER (OUTPATIENT)
Dept: LAB | Facility: HOSPITAL | Age: 61
Discharge: HOME | End: 2021-08-26
Attending: INTERNAL MEDICINE
Payer: COMMERCIAL

## 2021-08-26 DIAGNOSIS — Z79.01 LONG TERM (CURRENT) USE OF ANTICOAGULANTS: ICD-10-CM

## 2021-08-26 LAB — POC INR: 4.5 (ref 0.9–1.1)

## 2021-08-26 PROCEDURE — 85610 PROTHROMBIN TIME: CPT

## 2021-08-27 ENCOUNTER — HOSPITAL ENCOUNTER (OUTPATIENT)
Dept: LAB | Facility: HOSPITAL | Age: 61
Discharge: HOME | End: 2021-08-27
Attending: INTERNAL MEDICINE
Payer: COMMERCIAL

## 2021-08-27 DIAGNOSIS — Z79.01 LONG TERM (CURRENT) USE OF ANTICOAGULANTS: ICD-10-CM

## 2021-08-27 LAB — POC INR: 3.6 (ref 0.9–1.1)

## 2021-08-27 PROCEDURE — 85610 PROTHROMBIN TIME: CPT

## 2021-08-27 RX ORDER — TAMSULOSIN HYDROCHLORIDE 0.4 MG/1
CAPSULE ORAL
Qty: 90 CAPSULE | Refills: 1 | OUTPATIENT
Start: 2021-08-27

## 2021-08-30 RX ORDER — CARVEDILOL 25 MG/1
TABLET ORAL
COMMUNITY
Start: 2021-08-26

## 2021-08-30 RX ORDER — TAMSULOSIN HYDROCHLORIDE 0.4 MG/1
CAPSULE ORAL
Qty: 90 CAPSULE | Refills: 1 | OUTPATIENT
Start: 2021-08-30

## 2021-08-30 NOTE — TELEPHONE ENCOUNTER
Pt does not take the medication anymore it put him in the hospital so please remove. He did schedule for his diabetic follow up.

## 2021-09-28 ENCOUNTER — OFFICE VISIT (OUTPATIENT)
Dept: FAMILY MEDICINE CLINIC | Facility: CLINIC | Age: 61
End: 2021-09-28
Payer: COMMERCIAL

## 2021-09-28 ENCOUNTER — TELEPHONE (OUTPATIENT)
Dept: FAMILY MEDICINE CLINIC | Facility: CLINIC | Age: 61
End: 2021-09-28

## 2021-09-28 VITALS
BODY MASS INDEX: 30.77 KG/M2 | HEIGHT: 71 IN | SYSTOLIC BLOOD PRESSURE: 136 MMHG | DIASTOLIC BLOOD PRESSURE: 80 MMHG | RESPIRATION RATE: 16 BRPM | HEART RATE: 82 BPM | WEIGHT: 219.81 LBS

## 2021-09-28 DIAGNOSIS — I10 ESSENTIAL HYPERTENSION: ICD-10-CM

## 2021-09-28 DIAGNOSIS — E78.5 HYPERLIPIDEMIA LDL GOAL <100: ICD-10-CM

## 2021-09-28 DIAGNOSIS — E11.65 UNCONTROLLED TYPE 2 DIABETES MELLITUS WITH HYPERGLYCEMIA, WITHOUT LONG-TERM CURRENT USE OF INSULIN (HCC): Primary | ICD-10-CM

## 2021-09-28 DIAGNOSIS — E11.9 TYPE 2 DIABETES MELLITUS WITHOUT COMPLICATION, WITHOUT LONG-TERM CURRENT USE OF INSULIN (HCC): Primary | ICD-10-CM

## 2021-09-28 DIAGNOSIS — N40.1 BPH ASSOCIATED WITH NOCTURIA: ICD-10-CM

## 2021-09-28 DIAGNOSIS — Z12.5 SCREENING FOR MALIGNANT NEOPLASM OF PROSTATE: ICD-10-CM

## 2021-09-28 DIAGNOSIS — R35.1 BPH ASSOCIATED WITH NOCTURIA: ICD-10-CM

## 2021-09-28 DIAGNOSIS — I48.20 CHRONIC ATRIAL FIBRILLATION (HCC): ICD-10-CM

## 2021-09-28 LAB
CARTRIDGE LOT#: 846 NUMERIC
HEMOGLOBIN A1C: 6 % (ref 4.3–5.6)

## 2021-09-28 PROCEDURE — 3075F SYST BP GE 130 - 139MM HG: CPT | Performed by: FAMILY MEDICINE

## 2021-09-28 PROCEDURE — 3008F BODY MASS INDEX DOCD: CPT | Performed by: FAMILY MEDICINE

## 2021-09-28 PROCEDURE — 3079F DIAST BP 80-89 MM HG: CPT | Performed by: FAMILY MEDICINE

## 2021-09-28 PROCEDURE — 83036 HEMOGLOBIN GLYCOSYLATED A1C: CPT | Performed by: FAMILY MEDICINE

## 2021-09-28 PROCEDURE — 99214 OFFICE O/P EST MOD 30 MIN: CPT | Performed by: FAMILY MEDICINE

## 2021-09-28 NOTE — TELEPHONE ENCOUNTER
Please enter lab orders for the patient's upcoming physical appointment. Physical scheduled:    Your appointments     Date & Time Appointment Department [facility])    Mar 29, 2022  3:30 PM CDT Physical - Established with Dick Morel MD [facility]

## 2021-09-28 NOTE — PROGRESS NOTES
Patient presents with:  Diabetes: follow up      HPI:   Althea Juarez is a 64year old male who presents for a diabetic visit. Typical blood sugar levels are not checked. Current diabetic medications are: metfomrin  Diet: not eating that much.   Not 215 lb (97.5 kg)  03/22/21 : 213 lb 1.6 oz (96.7 kg)  03/10/21 : 222 lb (100.7 kg)  09/09/20 : 234 lb 3.2 oz (106.2 kg)  03/04/20 : 255 lb (115.7 kg)      General: alert, well appearing, and in no distress  HEENT: oropharynx clear, pupils equal and reactiv

## 2021-10-20 ENCOUNTER — APPOINTMENT (OUTPATIENT)
Dept: CARDIOLOGY | Age: 61
End: 2021-10-20

## 2021-10-29 ENCOUNTER — HOSPITAL ENCOUNTER (OUTPATIENT)
Dept: LAB | Facility: HOSPITAL | Age: 61
Discharge: HOME | End: 2021-10-29
Attending: INTERNAL MEDICINE
Payer: COMMERCIAL

## 2021-10-29 DIAGNOSIS — Z79.01 LONG TERM (CURRENT) USE OF ANTICOAGULANTS: ICD-10-CM

## 2021-10-29 PROCEDURE — 85610 PROTHROMBIN TIME: CPT

## 2021-11-11 ENCOUNTER — HOSPITAL ENCOUNTER (OUTPATIENT)
Dept: LAB | Facility: HOSPITAL | Age: 61
Discharge: HOME | End: 2021-11-11
Attending: INTERNAL MEDICINE
Payer: COMMERCIAL

## 2021-11-11 DIAGNOSIS — Z79.01 LONG TERM (CURRENT) USE OF ANTICOAGULANTS: ICD-10-CM

## 2021-11-11 PROCEDURE — 85610 PROTHROMBIN TIME: CPT

## 2021-11-26 DIAGNOSIS — E11.65 UNCONTROLLED TYPE 2 DIABETES MELLITUS WITH HYPERGLYCEMIA, WITHOUT LONG-TERM CURRENT USE OF INSULIN (HCC): ICD-10-CM

## 2021-11-29 DIAGNOSIS — E11.65 UNCONTROLLED TYPE 2 DIABETES MELLITUS WITH HYPERGLYCEMIA, WITHOUT LONG-TERM CURRENT USE OF INSULIN (HCC): ICD-10-CM

## 2021-11-30 NOTE — TELEPHONE ENCOUNTER
Refill request for  Metformin  LOV 9/28/21- advised to f/u in 6 months  Last A1C 9/28/21 6.0  Last BMP 3/29/21  Refilled per protocol

## 2021-12-09 ENCOUNTER — HOSPITAL ENCOUNTER (OUTPATIENT)
Dept: LAB | Facility: HOSPITAL | Age: 61
Discharge: HOME | End: 2021-12-09
Attending: INTERNAL MEDICINE
Payer: COMMERCIAL

## 2021-12-09 DIAGNOSIS — Z79.01 LONG TERM (CURRENT) USE OF ANTICOAGULANTS: ICD-10-CM

## 2021-12-09 PROCEDURE — 85610 PROTHROMBIN TIME: CPT

## 2022-02-01 ENCOUNTER — HOSPITAL ENCOUNTER (OUTPATIENT)
Dept: LAB | Facility: HOSPITAL | Age: 62
Discharge: HOME | End: 2022-02-01
Attending: INTERNAL MEDICINE
Payer: COMMERCIAL

## 2022-02-01 DIAGNOSIS — Z79.01 LONG TERM (CURRENT) USE OF ANTICOAGULANTS: ICD-10-CM

## 2022-02-01 LAB — INR BLDC: 2.4 (ref 0.9–1.1)

## 2022-02-01 PROCEDURE — 85610 PROTHROMBIN TIME: CPT

## 2022-02-12 ENCOUNTER — LAB ENCOUNTER (OUTPATIENT)
Dept: LAB | Facility: HOSPITAL | Age: 62
End: 2022-02-12
Attending: INTERNAL MEDICINE
Payer: COMMERCIAL

## 2022-02-12 DIAGNOSIS — Z86.010 PERSONAL HISTORY OF COLONIC POLYPS: ICD-10-CM

## 2022-02-12 LAB — SARS-COV-2 RNA RESP QL NAA+PROBE: NOT DETECTED

## 2022-02-15 ENCOUNTER — HOSPITAL ENCOUNTER (OUTPATIENT)
Facility: HOSPITAL | Age: 62
Setting detail: HOSPITAL OUTPATIENT SURGERY
Discharge: HOME | End: 2022-02-15
Attending: INTERNAL MEDICINE | Admitting: INTERNAL MEDICINE
Payer: COMMERCIAL

## 2022-02-15 ENCOUNTER — ANESTHESIA EVENT (OUTPATIENT)
Dept: ENDOSCOPY | Facility: HOSPITAL | Age: 62
End: 2022-02-15
Payer: COMMERCIAL

## 2022-02-15 ENCOUNTER — ANESTHESIA (OUTPATIENT)
Dept: ENDOSCOPY | Facility: HOSPITAL | Age: 62
End: 2022-02-15
Payer: COMMERCIAL

## 2022-02-15 VITALS
WEIGHT: 205 LBS | TEMPERATURE: 98 F | DIASTOLIC BLOOD PRESSURE: 84 MMHG | BODY MASS INDEX: 28.7 KG/M2 | RESPIRATION RATE: 18 BRPM | HEART RATE: 61 BPM | SYSTOLIC BLOOD PRESSURE: 112 MMHG | HEIGHT: 71 IN | OXYGEN SATURATION: 97 %

## 2022-02-15 DIAGNOSIS — Z86.010 PERSONAL HISTORY OF COLONIC POLYPS: Primary | ICD-10-CM

## 2022-02-15 LAB
GLUCOSE BLD-MCNC: 122 MG/DL (ref 70–99)
INR: 1.2 (ref 0.8–1.3)

## 2022-02-15 PROCEDURE — 82962 GLUCOSE BLOOD TEST: CPT

## 2022-02-15 PROCEDURE — 0DJD8ZZ INSPECTION OF LOWER INTESTINAL TRACT, VIA NATURAL OR ARTIFICIAL OPENING ENDOSCOPIC: ICD-10-PCS | Performed by: INTERNAL MEDICINE

## 2022-02-15 RX ORDER — NICOTINE POLACRILEX 4 MG
15 LOZENGE BUCCAL
Status: DISCONTINUED | OUTPATIENT
Start: 2022-02-15 | End: 2022-02-15

## 2022-02-15 RX ORDER — LIDOCAINE HYDROCHLORIDE 10 MG/ML
INJECTION, SOLUTION EPIDURAL; INFILTRATION; INTRACAUDAL; PERINEURAL AS NEEDED
Status: DISCONTINUED | OUTPATIENT
Start: 2022-02-15 | End: 2022-02-15 | Stop reason: SURG

## 2022-02-15 RX ORDER — DEXTROSE MONOHYDRATE 25 G/50ML
50 INJECTION, SOLUTION INTRAVENOUS
Status: DISCONTINUED | OUTPATIENT
Start: 2022-02-15 | End: 2022-02-15

## 2022-02-15 RX ORDER — SODIUM CHLORIDE, SODIUM LACTATE, POTASSIUM CHLORIDE, CALCIUM CHLORIDE 600; 310; 30; 20 MG/100ML; MG/100ML; MG/100ML; MG/100ML
INJECTION, SOLUTION INTRAVENOUS CONTINUOUS
Status: DISCONTINUED | OUTPATIENT
Start: 2022-02-15 | End: 2022-02-15

## 2022-02-15 RX ORDER — NICOTINE POLACRILEX 4 MG
30 LOZENGE BUCCAL
Status: DISCONTINUED | OUTPATIENT
Start: 2022-02-15 | End: 2022-02-15

## 2022-02-15 RX ORDER — NALOXONE HYDROCHLORIDE 0.4 MG/ML
80 INJECTION, SOLUTION INTRAMUSCULAR; INTRAVENOUS; SUBCUTANEOUS AS NEEDED
Status: DISCONTINUED | OUTPATIENT
Start: 2022-02-15 | End: 2022-02-15

## 2022-02-15 RX ADMIN — SODIUM CHLORIDE, SODIUM LACTATE, POTASSIUM CHLORIDE, CALCIUM CHLORIDE: 600; 310; 30; 20 INJECTION, SOLUTION INTRAVENOUS at 11:59:00

## 2022-02-15 RX ADMIN — LIDOCAINE HYDROCHLORIDE 50 MG: 10 INJECTION, SOLUTION EPIDURAL; INFILTRATION; INTRACAUDAL; PERINEURAL at 12:01:00

## 2022-02-15 NOTE — OPERATIVE REPORT
Aurelia Spivey Patient Status:  Hospital Outpatient Surgery    1960 MRN [id number]   Location [id number] [place] [address] Attending Ta Alva MD   Date 2/15/2022 PCP William Johnson MD     PREOPERATIVE DIAGNOSIS/INDICATION: H/o colon polyps  POSTOPERTATIVE DIAGNOSIS: Diverticulosis, hemorrhoids  PROCEDURE PERFORMED: COLONOSCOPY  SEDATION: MAC sedation provided by General Anesthesia    TIME OUT WAS PERFORMED    INFORMED CONSENT: Risks, benefits and alternatives to the procedure were explained to the patient including but not limited to bleeding, infection, perforation, adverse drug reactions, pancreatitis and the need for hospitalization and surgery if this occurs, the patient understands and agrees to procedure. PROCEDURE DESCRIPTION: After careful digital rectal examination a pediatric colonoscope was introduced into the patients rectum, advanced pass the recto sigmoid junction, into the descending colon, splenic flexure, transverse colon, hepatic flexure, ascending colon, cecum and the last 5-10cm of the terminal ileum, confirmed by landmarks, including the appendiceal orifice and ileocecal valve. Careful examination of the above described areas was performed on withdrawal of the endoscope. Retroflexion was performed on the rectum. The patient tolerated the procedure well, there were no immediate complication immediately following the procedure, and the patient was transferred to recovery in stable condition. QUALITY OF PREPARATION: [facility] Bowel Preparation Scale:            -      Right colon 0-3, Transverse colon 0-3, Left colon 0-3   FINDINGS/THERAPEUTICS:  - TERMINAL ILEUM: Normal  - COLON: Moderate sigmoid colon diverticulosis  - RECTUM: Grade 1-2 Internal hemorrhoids  RECOMMENDATIONS:   - Post Colonoscopy precautions, watch for bleeding, infection, perforation, adverse drug reactions   - Repeat colonoscopy in 10 years.     Tyrone Camargo MD  2/15/2022  12:18 PM

## 2022-02-15 NOTE — ANESTHESIA POSTPROCEDURE EVALUATION
[address] [facility] Patient Status:  Hospital Outpatient Surgery   Age/Gender 64year old male MRN [id number]   Location [address] [address] Attending Matthew Grady MD   [facility] Day # 0 PCP Odilia Palmer MD       Anesthesia Post-op Note    COLONOSCOPY    Procedure Summary     Date: 02/15/22 Room / Location: [facility] ENDOSCOPY 02 / [facility] ENDOSCOPY    Anesthesia Start: 9663 Anesthesia Stop:     Procedure: COLONOSCOPY (N/A ) Diagnosis:       Personal history of colonic polyps      (diverticulosis, hemorrhoids)    Surgeons: Matthew Grady MD Anesthesiologist: Js Desouza MD    Anesthesia Type: MAC ASA Status: 3          Anesthesia Type: MAC    Vitals Value Taken Time   /85 02/15/22 1221   Temp na 02/15/22 1221   Pulse 73 02/15/22 1220   Resp 16 02/15/22 1221   SpO2 99 % 02/15/22 1220   Vitals shown include unvalidated device data. Patient Location: Endoscopy    Anesthesia Type: MAC    Airway Patency: patent    Postop Pain Control: adequate    Mental Status: mildly sedated but able to meaningfully participate in the post-anesthesia evaluation    Nausea/Vomiting: none    Cardiopulmonary/Hydration status: stable euvolemic    Complications: no apparent anesthesia related complications    Postop vital signs: stable    Dental Exam: Unchanged from Preop    Patient to be discharged from PACU when criteria met.

## 2022-03-05 ENCOUNTER — HOSPITAL ENCOUNTER (OUTPATIENT)
Dept: LAB | Facility: HOSPITAL | Age: 62
Discharge: HOME | End: 2022-03-05
Attending: INTERNAL MEDICINE
Payer: COMMERCIAL

## 2022-03-05 DIAGNOSIS — Z79.01 ANTICOAGULATED ON COUMADIN: ICD-10-CM

## 2022-03-05 LAB — INR BLDC: 1.8 (ref 0.9–1.1)

## 2022-03-05 PROCEDURE — 85610 PROTHROMBIN TIME: CPT

## 2022-03-29 ENCOUNTER — OFFICE VISIT (OUTPATIENT)
Dept: FAMILY MEDICINE CLINIC | Facility: CLINIC | Age: 62
End: 2022-03-29
Payer: COMMERCIAL

## 2022-03-29 VITALS
DIASTOLIC BLOOD PRESSURE: 70 MMHG | OXYGEN SATURATION: 99 % | BODY MASS INDEX: 30.1 KG/M2 | RESPIRATION RATE: 16 BRPM | TEMPERATURE: 98 F | HEART RATE: 72 BPM | SYSTOLIC BLOOD PRESSURE: 120 MMHG | HEIGHT: 71 IN | WEIGHT: 215 LBS

## 2022-03-29 DIAGNOSIS — E11.9 TYPE 2 DIABETES MELLITUS WITHOUT COMPLICATION, WITHOUT LONG-TERM CURRENT USE OF INSULIN (HCC): ICD-10-CM

## 2022-03-29 DIAGNOSIS — R35.1 BPH ASSOCIATED WITH NOCTURIA: ICD-10-CM

## 2022-03-29 DIAGNOSIS — E78.5 HYPERLIPIDEMIA LDL GOAL <100: ICD-10-CM

## 2022-03-29 DIAGNOSIS — I10 ESSENTIAL HYPERTENSION: ICD-10-CM

## 2022-03-29 DIAGNOSIS — N40.1 BPH ASSOCIATED WITH NOCTURIA: ICD-10-CM

## 2022-03-29 DIAGNOSIS — L72.9 SCALP CYST: ICD-10-CM

## 2022-03-29 DIAGNOSIS — J45.20 MILD INTERMITTENT ASTHMA WITHOUT COMPLICATION: ICD-10-CM

## 2022-03-29 DIAGNOSIS — I48.20 CHRONIC ATRIAL FIBRILLATION (HCC): ICD-10-CM

## 2022-03-29 DIAGNOSIS — Z00.00 ANNUAL PHYSICAL EXAM: Primary | ICD-10-CM

## 2022-03-29 PROCEDURE — 3008F BODY MASS INDEX DOCD: CPT | Performed by: FAMILY MEDICINE

## 2022-03-29 PROCEDURE — 99396 PREV VISIT EST AGE 40-64: CPT | Performed by: FAMILY MEDICINE

## 2022-03-29 PROCEDURE — 3074F SYST BP LT 130 MM HG: CPT | Performed by: FAMILY MEDICINE

## 2022-03-29 PROCEDURE — 3078F DIAST BP <80 MM HG: CPT | Performed by: FAMILY MEDICINE

## 2022-04-01 ENCOUNTER — HOSPITAL ENCOUNTER (OUTPATIENT)
Dept: LAB | Facility: HOSPITAL | Age: 62
Discharge: HOME | End: 2022-04-01
Attending: INTERNAL MEDICINE
Payer: COMMERCIAL

## 2022-04-01 DIAGNOSIS — Z79.01 ANTICOAGULATED ON COUMADIN: ICD-10-CM

## 2022-04-01 LAB — INR BLDC: 3.1 (ref 0.9–1.1)

## 2022-04-01 PROCEDURE — 85610 PROTHROMBIN TIME: CPT

## 2022-05-11 ENCOUNTER — HOSPITAL ENCOUNTER (OUTPATIENT)
Dept: LAB | Facility: HOSPITAL | Age: 62
Discharge: HOME | End: 2022-05-11
Attending: INTERNAL MEDICINE
Payer: COMMERCIAL

## 2022-05-11 DIAGNOSIS — Z79.01 ANTICOAGULATED ON COUMADIN: ICD-10-CM

## 2022-05-11 LAB — INR BLDC: 2.8 (ref 0.9–1.1)

## 2022-05-11 PROCEDURE — 85610 PROTHROMBIN TIME: CPT

## 2022-05-19 ENCOUNTER — PATIENT OUTREACH (OUTPATIENT)
Dept: FAMILY MEDICINE CLINIC | Facility: CLINIC | Age: 62
End: 2022-05-19

## 2022-07-22 ENCOUNTER — HOSPITAL ENCOUNTER (OUTPATIENT)
Dept: LAB | Facility: HOSPITAL | Age: 62
Discharge: HOME | End: 2022-07-22
Attending: INTERNAL MEDICINE
Payer: COMMERCIAL

## 2022-07-22 DIAGNOSIS — Z79.01 ANTICOAGULATED ON COUMADIN: ICD-10-CM

## 2022-07-22 LAB — INR BLDC: 2.2 (ref 0.9–1.1)

## 2022-07-22 PROCEDURE — 85610 PROTHROMBIN TIME: CPT

## 2022-08-23 DIAGNOSIS — E11.65 UNCONTROLLED TYPE 2 DIABETES MELLITUS WITH HYPERGLYCEMIA, WITHOUT LONG-TERM CURRENT USE OF INSULIN (HCC): ICD-10-CM

## 2022-10-05 ENCOUNTER — OFFICE VISIT (OUTPATIENT)
Dept: FAMILY MEDICINE CLINIC | Facility: CLINIC | Age: 62
End: 2022-10-05
Payer: COMMERCIAL

## 2022-10-05 ENCOUNTER — HOSPITAL ENCOUNTER (OUTPATIENT)
Dept: LAB | Facility: HOSPITAL | Age: 62
Discharge: HOME | End: 2022-10-05
Attending: INTERNAL MEDICINE
Payer: COMMERCIAL

## 2022-10-05 VITALS
HEART RATE: 78 BPM | OXYGEN SATURATION: 98 % | HEIGHT: 71 IN | RESPIRATION RATE: 16 BRPM | DIASTOLIC BLOOD PRESSURE: 72 MMHG | BODY MASS INDEX: 28.42 KG/M2 | TEMPERATURE: 98 F | WEIGHT: 203 LBS | SYSTOLIC BLOOD PRESSURE: 114 MMHG

## 2022-10-05 DIAGNOSIS — I48.20 CHRONIC ATRIAL FIBRILLATION (HCC): ICD-10-CM

## 2022-10-05 DIAGNOSIS — Z79.01 LONG TERM (CURRENT) USE OF ANTICOAGULANTS: ICD-10-CM

## 2022-10-05 DIAGNOSIS — I10 ESSENTIAL HYPERTENSION: ICD-10-CM

## 2022-10-05 DIAGNOSIS — E11.9 TYPE 2 DIABETES MELLITUS WITHOUT COMPLICATION, WITHOUT LONG-TERM CURRENT USE OF INSULIN (HCC): Primary | ICD-10-CM

## 2022-10-05 DIAGNOSIS — E78.5 HYPERLIPIDEMIA LDL GOAL <100: ICD-10-CM

## 2022-10-05 LAB
CARTRIDGE LOT#: 924 NUMERIC
CREAT UR-SCNC: 159 MG/DL
HEMOGLOBIN A1C: 5.9 % (ref 4.3–5.6)
INR BLDC: 3.5 (ref 0.9–1.1)
MICROALBUMIN UR-MCNC: 0.88 MG/DL
MICROALBUMIN/CREAT 24H UR-RTO: 5.5 UG/MG (ref ?–30)

## 2022-10-05 PROCEDURE — 3044F HG A1C LEVEL LT 7.0%: CPT | Performed by: FAMILY MEDICINE

## 2022-10-05 PROCEDURE — 3008F BODY MASS INDEX DOCD: CPT | Performed by: FAMILY MEDICINE

## 2022-10-05 PROCEDURE — 85610 PROTHROMBIN TIME: CPT | Performed by: INTERNAL MEDICINE

## 2022-10-05 PROCEDURE — 82570 ASSAY OF URINE CREATININE: CPT | Performed by: FAMILY MEDICINE

## 2022-10-05 PROCEDURE — 82043 UR ALBUMIN QUANTITATIVE: CPT | Performed by: FAMILY MEDICINE

## 2022-10-05 PROCEDURE — 3074F SYST BP LT 130 MM HG: CPT | Performed by: FAMILY MEDICINE

## 2022-10-05 PROCEDURE — 99214 OFFICE O/P EST MOD 30 MIN: CPT | Performed by: FAMILY MEDICINE

## 2022-10-05 PROCEDURE — 83036 HEMOGLOBIN GLYCOSYLATED A1C: CPT | Performed by: FAMILY MEDICINE

## 2022-10-05 PROCEDURE — 3078F DIAST BP <80 MM HG: CPT | Performed by: FAMILY MEDICINE

## 2022-10-13 ENCOUNTER — HOSPITAL ENCOUNTER (OUTPATIENT)
Dept: LAB | Facility: HOSPITAL | Age: 62
Discharge: HOME | End: 2022-10-13
Attending: INTERNAL MEDICINE
Payer: COMMERCIAL

## 2022-10-13 DIAGNOSIS — Z79.01 LONG TERM (CURRENT) USE OF ANTICOAGULANTS: ICD-10-CM

## 2022-10-13 DIAGNOSIS — I48.20 CHRONIC ATRIAL FIBRILLATION (HCC): ICD-10-CM

## 2022-10-13 LAB — INR BLDC: 3.2 (ref 0.9–1.1)

## 2022-10-13 PROCEDURE — 85610 PROTHROMBIN TIME: CPT | Performed by: INTERNAL MEDICINE

## 2022-11-21 ENCOUNTER — HOSPITAL ENCOUNTER (OUTPATIENT)
Dept: LAB | Facility: HOSPITAL | Age: 62
Discharge: HOME | End: 2022-11-21
Attending: INTERNAL MEDICINE
Payer: COMMERCIAL

## 2022-11-21 DIAGNOSIS — Z79.01 LONG TERM (CURRENT) USE OF ANTICOAGULANTS: ICD-10-CM

## 2022-11-21 DIAGNOSIS — I48.20 CHRONIC ATRIAL FIBRILLATION (HCC): ICD-10-CM

## 2022-11-21 LAB — INR BLDC: 1.8 (ref 0.9–1.1)

## 2022-11-21 PROCEDURE — 85610 PROTHROMBIN TIME: CPT | Performed by: INTERNAL MEDICINE

## 2022-12-22 ENCOUNTER — TELEPHONE (OUTPATIENT)
Dept: FAMILY MEDICINE CLINIC | Facility: CLINIC | Age: 62
End: 2022-12-22

## 2022-12-23 ENCOUNTER — TELEMEDICINE (OUTPATIENT)
Dept: FAMILY MEDICINE CLINIC | Facility: CLINIC | Age: 62
End: 2022-12-23
Payer: COMMERCIAL

## 2022-12-23 ENCOUNTER — TELEPHONE (OUTPATIENT)
Dept: FAMILY MEDICINE CLINIC | Facility: CLINIC | Age: 62
End: 2022-12-23

## 2022-12-23 DIAGNOSIS — U07.1 COVID-19: Primary | ICD-10-CM

## 2022-12-23 PROCEDURE — 99213 OFFICE O/P EST LOW 20 MIN: CPT | Performed by: STUDENT IN AN ORGANIZED HEALTH CARE EDUCATION/TRAINING PROGRAM

## 2022-12-23 NOTE — PROGRESS NOTES
Telemedicine video encounter documentation    This video encounter was conducted with the patient's (or proxy's) verbal consent via secure, interactive audio and video telecommunications. The patient (or proxy) was instructed to have this encounter in a suitably private space and to only have persons present to whom they give permission to participate. In addition, patient identity was confirmed by use of name plus two identifiers. Chief Complaint:     Positive Covid19 Home Test    Subjective: Lorena Singleton is a 58year old male established patient. Video visit today for:    HPI  Positive COVID19 test  Patient was in his usual state of health until developed congestion. Had covid test done at [facility] and tested negative, then the next day tested positive (12/22). 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 No fevers. He has been coughing. No dyspnea. Staying hydrated. Not enorsing N/V/D. Not endorsing loss of taste or smell. He reported that he recently got his covid booster in October.      Pt is on warfarin and digoxin for afib     Patient Active Problem List:     Chronic atrial fibrillation (HCC)     Asthma     Obstructive sleep apnea syndrome     Osteoarthrosis, unspecified whether generalized or localized, lower leg     Cervical spine pain     Thoracic spine pain     Low back pain     Degenerative cervical disc     Degenerative arthritis of thoracic spine     Arthritis of knee, left     Medial meniscus tear     Diabetes mellitus type II, uncontrolled     Complex tear of medial meniscus of left knee as current injury     Essential hypertension     Hyperlipidemia LDL goal <100     Type 2 diabetes mellitus without complication, without long-term current use of insulin (HCC)     Pure hypercholesterolemia     Obesity     Hypotension     Azotemia     Hypotension, unspecified hypotension type     Epigastric pain     Hyponatremia     Syncope, near      carvedilol 25 MG Oral Tab, Take 25 mg by mouth 2 (two) times daily with meals. , Disp: , Rfl:   Azelastine HCl 0.1 % Nasal Solution, 1 spray by Nasal route 2 (two) times daily as needed for Rhinitis., Disp: , Rfl:   Warfarin Sodium 5 MG Oral Tab, Take 5 mg by mouth. Takes 7.5mg daily Monday-Saturday, and 5mg on Sunday. , Disp: , Rfl:   Albuterol Sulfate HFA (PROAIR HFA) 108 (90 Base) MCG/ACT Inhalation Aero Soln, Inhale 2 puffs into the lungs every 4 (four) hours as needed for Wheezing., Disp: 1 Inhaler, Rfl: 2  Atorvastatin Calcium (LIPITOR) 40 MG Oral Tab, Take 40 mg by mouth daily. , Disp: , Rfl:   digoxin (LANOXIN) 0.125 MG Oral Tab, Take 125 mcg by mouth daily. , Disp: , Rfl:     No facility-administered medications prior to visit. Allergies Reviewed  Soc Hx reviewed    ROS   See HPI for other ROS    Objective:     Vitals as reported by patient:  Afebrile  There is no height or weight on file to calculate BMI. Constitutional: well-appearing  Mental status: linear, affect normal  Respiratory: comfortable WOB    Assessment/Plan:     Diagnoses and all orders for this visit:    COVID-19    Pt is overall doing well, mild symptoms. No dyspnea. Reviewed ED precautions and supportive care. He does not have a pulse ox at home. Considered paxlovid, which may be doable with warfarin but we would also have to adjust dose of digoxin. We will defer paxlovid and do supportive care. He expresses understanding and agreement with this plan. CDC isolation and quaraintine guidelines discussed and resource provided. [facility]    No follow-ups on file. [unfilled]    Patient Instructions   [email or web address]. pdf

## 2022-12-23 NOTE — TELEPHONE ENCOUNTER
After Hours Call  Rec'd call that patient tested positive for COVID19. No dyspnea, primary s/s cough. Briefly reviewed chart, appears he may qualify for treatment. Recommended calling office in AM for a virtual appt. ED precautions reviewed, pt expresses understanding and agreement w/this plan.

## 2022-12-23 NOTE — TELEPHONE ENCOUNTER
LM for patient to call back and schedule his video call with Dr. Devyn Padron today for positive Covid.

## 2022-12-28 ENCOUNTER — HOSPITAL ENCOUNTER (OUTPATIENT)
Dept: LAB | Facility: HOSPITAL | Age: 62
Discharge: HOME | End: 2022-12-28
Attending: INTERNAL MEDICINE
Payer: COMMERCIAL

## 2022-12-28 DIAGNOSIS — I48.20 CHRONIC ATRIAL FIBRILLATION (HCC): ICD-10-CM

## 2022-12-28 DIAGNOSIS — Z79.01 LONG TERM (CURRENT) USE OF ANTICOAGULANTS: ICD-10-CM

## 2022-12-28 LAB — INR BLDC: 1.9 (ref 0.9–1.1)

## 2022-12-28 PROCEDURE — 85610 PROTHROMBIN TIME: CPT | Performed by: INTERNAL MEDICINE

## 2023-02-23 ENCOUNTER — HOSPITAL ENCOUNTER (OUTPATIENT)
Dept: LAB | Facility: HOSPITAL | Age: 63
Discharge: HOME | End: 2023-02-23
Attending: FAMILY MEDICINE
Payer: COMMERCIAL

## 2023-02-23 ENCOUNTER — LAB ENCOUNTER (OUTPATIENT)
Dept: LAB | Facility: HOSPITAL | Age: 63
End: 2023-02-23
Attending: FAMILY MEDICINE
Payer: COMMERCIAL

## 2023-02-23 DIAGNOSIS — E78.5 HYPERLIPIDEMIA LDL GOAL <100: ICD-10-CM

## 2023-02-23 DIAGNOSIS — Z79.01 LONG TERM (CURRENT) USE OF ANTICOAGULANTS: ICD-10-CM

## 2023-02-23 DIAGNOSIS — I10 ESSENTIAL HYPERTENSION: ICD-10-CM

## 2023-02-23 DIAGNOSIS — I48.20 CHRONIC ATRIAL FIBRILLATION (HCC): Primary | ICD-10-CM

## 2023-02-23 DIAGNOSIS — E78.00 PURE HYPERCHOLESTEROLEMIA: ICD-10-CM

## 2023-02-23 DIAGNOSIS — E11.9 DIABETES MELLITUS (HCC): ICD-10-CM

## 2023-02-23 DIAGNOSIS — I48.20 CHRONIC ATRIAL FIBRILLATION (HCC): ICD-10-CM

## 2023-02-23 LAB
ALBUMIN SERPL-MCNC: 3.8 G/DL (ref 3.4–5)
ALBUMIN SERPL-MCNC: 3.8 G/DL (ref 3.4–5)
ALBUMIN/GLOB SERPL: 1.1 {RATIO} (ref 1–2)
ALBUMIN/GLOB SERPL: 1.2 {RATIO} (ref 1–2)
ALP LIVER SERPL-CCNC: 63 U/L
ALP LIVER SERPL-CCNC: 66 U/L
ALT SERPL-CCNC: 23 U/L
ALT SERPL-CCNC: 24 U/L
ANION GAP SERPL CALC-SCNC: 4 MMOL/L (ref 0–18)
ANION GAP SERPL CALC-SCNC: 6 MMOL/L (ref 0–18)
AST SERPL-CCNC: 18 U/L (ref 15–37)
AST SERPL-CCNC: 18 U/L (ref 15–37)
BASOPHILS # BLD AUTO: 0.05 X10(3) UL (ref 0–0.2)
BASOPHILS NFR BLD AUTO: 0.8 %
BILIRUB SERPL-MCNC: 1 MG/DL (ref 0.1–2)
BILIRUB SERPL-MCNC: 1 MG/DL (ref 0.1–2)
BUN BLD-MCNC: 12 MG/DL (ref 7–18)
BUN BLD-MCNC: 13 MG/DL (ref 7–18)
CALCIUM BLD-MCNC: 8.6 MG/DL (ref 8.5–10.1)
CALCIUM BLD-MCNC: 8.7 MG/DL (ref 8.5–10.1)
CHLORIDE SERPL-SCNC: 106 MMOL/L (ref 98–112)
CHLORIDE SERPL-SCNC: 107 MMOL/L (ref 98–112)
CHOLEST SERPL-MCNC: 267 MG/DL (ref ?–200)
CHOLEST SERPL-MCNC: 273 MG/DL (ref ?–200)
CO2 SERPL-SCNC: 29 MMOL/L (ref 21–32)
CO2 SERPL-SCNC: 30 MMOL/L (ref 21–32)
CREAT BLD-MCNC: 0.93 MG/DL
CREAT BLD-MCNC: 0.94 MG/DL
DIGOXIN SERPL-MCNC: 0.45 NG/ML (ref 0.8–2)
EOSINOPHIL # BLD AUTO: 0.14 X10(3) UL (ref 0–0.7)
EOSINOPHIL NFR BLD AUTO: 2.2 %
ERYTHROCYTE [DISTWIDTH] IN BLOOD BY AUTOMATED COUNT: 11.7 %
EST. AVERAGE GLUCOSE BLD GHB EST-MCNC: 151 MG/DL (ref 68–126)
FASTING PATIENT LIPID ANSWER: YES
FASTING PATIENT LIPID ANSWER: YES
FASTING STATUS PATIENT QL REPORTED: YES
FASTING STATUS PATIENT QL REPORTED: YES
GFR SERPLBLD BASED ON 1.73 SQ M-ARVRAT: 92 ML/MIN/1.73M2 (ref 60–?)
GFR SERPLBLD BASED ON 1.73 SQ M-ARVRAT: 93 ML/MIN/1.73M2 (ref 60–?)
GLOBULIN PLAS-MCNC: 3.3 G/DL (ref 2.8–4.4)
GLOBULIN PLAS-MCNC: 3.6 G/DL (ref 2.8–4.4)
GLUCOSE BLD-MCNC: 132 MG/DL (ref 70–99)
GLUCOSE BLD-MCNC: 133 MG/DL (ref 70–99)
HBA1C MFR BLD: 6.9 % (ref ?–5.7)
HCT VFR BLD AUTO: 42.3 %
HDLC SERPL-MCNC: 34 MG/DL (ref 40–59)
HDLC SERPL-MCNC: 34 MG/DL (ref 40–59)
HGB BLD-MCNC: 15.1 G/DL
IMM GRANULOCYTES # BLD AUTO: 0.02 X10(3) UL (ref 0–1)
IMM GRANULOCYTES NFR BLD: 0.3 %
INR BLDC: 2.9 (ref 0.9–1.1)
LDLC SERPL CALC-MCNC: 162 MG/DL (ref ?–100)
LDLC SERPL CALC-MCNC: 167 MG/DL (ref ?–100)
LYMPHOCYTES # BLD AUTO: 1.95 X10(3) UL (ref 1–4)
LYMPHOCYTES NFR BLD AUTO: 30 %
MCH RBC QN AUTO: 32 PG (ref 26–34)
MCHC RBC AUTO-ENTMCNC: 35.7 G/DL (ref 31–37)
MCV RBC AUTO: 89.6 FL
MONOCYTES # BLD AUTO: 0.48 X10(3) UL (ref 0.1–1)
MONOCYTES NFR BLD AUTO: 7.4 %
NEUTROPHILS # BLD AUTO: 3.86 X10 (3) UL (ref 1.5–7.7)
NEUTROPHILS # BLD AUTO: 3.86 X10(3) UL (ref 1.5–7.7)
NEUTROPHILS NFR BLD AUTO: 59.3 %
NONHDLC SERPL-MCNC: 233 MG/DL (ref ?–130)
NONHDLC SERPL-MCNC: 239 MG/DL (ref ?–130)
OSMOLALITY SERPL CALC.SUM OF ELEC: 292 MOSM/KG (ref 275–295)
OSMOLALITY SERPL CALC.SUM OF ELEC: 296 MOSM/KG (ref 275–295)
PLATELET # BLD AUTO: 220 10(3)UL (ref 150–450)
POTASSIUM SERPL-SCNC: 3.7 MMOL/L (ref 3.5–5.1)
POTASSIUM SERPL-SCNC: 3.7 MMOL/L (ref 3.5–5.1)
PROT SERPL-MCNC: 7.1 G/DL (ref 6.4–8.2)
PROT SERPL-MCNC: 7.4 G/DL (ref 6.4–8.2)
RBC # BLD AUTO: 4.72 X10(6)UL
SODIUM SERPL-SCNC: 140 MMOL/L (ref 136–145)
SODIUM SERPL-SCNC: 142 MMOL/L (ref 136–145)
TRIGL SERPL-MCNC: 372 MG/DL (ref 30–149)
TRIGL SERPL-MCNC: 372 MG/DL (ref 30–149)
VLDLC SERPL CALC-MCNC: 75 MG/DL (ref 0–30)
VLDLC SERPL CALC-MCNC: 76 MG/DL (ref 0–30)
WBC # BLD AUTO: 6.5 X10(3) UL (ref 4–11)

## 2023-02-23 PROCEDURE — 85610 PROTHROMBIN TIME: CPT | Performed by: INTERNAL MEDICINE

## 2023-02-23 PROCEDURE — 80053 COMPREHEN METABOLIC PANEL: CPT

## 2023-02-23 PROCEDURE — 85025 COMPLETE CBC W/AUTO DIFF WBC: CPT

## 2023-02-23 PROCEDURE — 3044F HG A1C LEVEL LT 7.0%: CPT | Performed by: FAMILY MEDICINE

## 2023-02-23 PROCEDURE — 80162 ASSAY OF DIGOXIN TOTAL: CPT

## 2023-02-23 PROCEDURE — 80061 LIPID PANEL: CPT

## 2023-02-23 PROCEDURE — 36415 COLL VENOUS BLD VENIPUNCTURE: CPT

## 2023-02-23 PROCEDURE — 83036 HEMOGLOBIN GLYCOSYLATED A1C: CPT

## 2023-03-23 ENCOUNTER — HOSPITAL ENCOUNTER (OUTPATIENT)
Dept: LAB | Facility: HOSPITAL | Age: 63
Discharge: HOME | End: 2023-03-23
Attending: INTERNAL MEDICINE
Payer: COMMERCIAL

## 2023-03-23 ENCOUNTER — APPOINTMENT (OUTPATIENT)
Dept: LAB | Facility: HOSPITAL | Age: 63
End: 2023-03-23
Attending: INTERNAL MEDICINE
Payer: COMMERCIAL

## 2023-03-23 DIAGNOSIS — Z79.01 LONG TERM (CURRENT) USE OF ANTICOAGULANTS: ICD-10-CM

## 2023-03-23 DIAGNOSIS — I48.20 CHRONIC ATRIAL FIBRILLATION (HCC): ICD-10-CM

## 2023-03-23 LAB — INR BLDC: 3.3 (ref 0.9–1.1)

## 2023-03-23 PROCEDURE — 85610 PROTHROMBIN TIME: CPT | Performed by: INTERNAL MEDICINE

## 2023-03-23 RX ORDER — ATORVASTATIN CALCIUM 40 MG/1
40 TABLET, FILM COATED ORAL DAILY
Qty: 90 TABLET | Refills: 0 | Status: SHIPPED | OUTPATIENT
Start: 2023-03-23

## 2023-04-05 ENCOUNTER — OFFICE VISIT (OUTPATIENT)
Dept: FAMILY MEDICINE CLINIC | Facility: CLINIC | Age: 63
End: 2023-04-05
Payer: COMMERCIAL

## 2023-04-05 VITALS
OXYGEN SATURATION: 98 % | HEIGHT: 71 IN | SYSTOLIC BLOOD PRESSURE: 134 MMHG | BODY MASS INDEX: 32.34 KG/M2 | DIASTOLIC BLOOD PRESSURE: 80 MMHG | WEIGHT: 231 LBS | HEART RATE: 54 BPM | RESPIRATION RATE: 16 BRPM

## 2023-04-05 DIAGNOSIS — E11.9 TYPE 2 DIABETES MELLITUS WITHOUT COMPLICATION, WITHOUT LONG-TERM CURRENT USE OF INSULIN (HCC): ICD-10-CM

## 2023-04-05 DIAGNOSIS — I10 ESSENTIAL HYPERTENSION: ICD-10-CM

## 2023-04-05 DIAGNOSIS — E78.5 HYPERLIPIDEMIA LDL GOAL <100: ICD-10-CM

## 2023-04-05 DIAGNOSIS — Z00.00 ANNUAL PHYSICAL EXAM: Primary | ICD-10-CM

## 2023-04-05 DIAGNOSIS — I48.20 CHRONIC ATRIAL FIBRILLATION (HCC): ICD-10-CM

## 2023-04-05 DIAGNOSIS — G31.84 MCI (MILD COGNITIVE IMPAIRMENT): ICD-10-CM

## 2023-04-05 LAB
CREAT UR-SCNC: 142 MG/DL
MICROALBUMIN UR-MCNC: 2.43 MG/DL
MICROALBUMIN/CREAT 24H UR-RTO: 17.1 UG/MG (ref ?–30)

## 2023-04-05 PROCEDURE — 3075F SYST BP GE 130 - 139MM HG: CPT | Performed by: FAMILY MEDICINE

## 2023-04-05 PROCEDURE — 99213 OFFICE O/P EST LOW 20 MIN: CPT | Performed by: FAMILY MEDICINE

## 2023-04-05 PROCEDURE — 82043 UR ALBUMIN QUANTITATIVE: CPT | Performed by: FAMILY MEDICINE

## 2023-04-05 PROCEDURE — 99396 PREV VISIT EST AGE 40-64: CPT | Performed by: FAMILY MEDICINE

## 2023-04-05 PROCEDURE — 82570 ASSAY OF URINE CREATININE: CPT | Performed by: FAMILY MEDICINE

## 2023-04-05 PROCEDURE — 3008F BODY MASS INDEX DOCD: CPT | Performed by: FAMILY MEDICINE

## 2023-04-05 PROCEDURE — 3079F DIAST BP 80-89 MM HG: CPT | Performed by: FAMILY MEDICINE

## 2023-06-06 ENCOUNTER — HOSPITAL ENCOUNTER (OUTPATIENT)
Dept: LAB | Facility: HOSPITAL | Age: 63
Discharge: HOME | End: 2023-06-06
Attending: INTERNAL MEDICINE
Payer: COMMERCIAL

## 2023-06-06 DIAGNOSIS — I48.20 CHRONIC ATRIAL FIBRILLATION (HCC): ICD-10-CM

## 2023-06-06 DIAGNOSIS — Z79.01 LONG TERM (CURRENT) USE OF ANTICOAGULANTS: ICD-10-CM

## 2023-06-06 LAB — INR BLDC: 3.2 (ref 0.9–1.1)

## 2023-06-06 PROCEDURE — 85610 PROTHROMBIN TIME: CPT | Performed by: INTERNAL MEDICINE

## 2023-07-05 ENCOUNTER — HOSPITAL ENCOUNTER (OUTPATIENT)
Dept: LAB | Facility: HOSPITAL | Age: 63
Discharge: HOME | End: 2023-07-05
Attending: INTERNAL MEDICINE
Payer: COMMERCIAL

## 2023-07-05 DIAGNOSIS — Z79.01 LONG TERM (CURRENT) USE OF ANTICOAGULANTS: ICD-10-CM

## 2023-07-05 DIAGNOSIS — I48.20 CHRONIC ATRIAL FIBRILLATION (HCC): ICD-10-CM

## 2023-07-05 LAB — INR BLDC: 2.8 (ref 0.9–1.1)

## 2023-07-05 PROCEDURE — 85610 PROTHROMBIN TIME: CPT | Performed by: INTERNAL MEDICINE

## 2023-07-05 RX ORDER — ATORVASTATIN CALCIUM 40 MG/1
TABLET, FILM COATED ORAL
Qty: 90 TABLET | Refills: 0 | Status: SHIPPED | OUTPATIENT
Start: 2023-07-05

## 2023-07-20 ENCOUNTER — HOSPITAL ENCOUNTER (OUTPATIENT)
Dept: LAB | Facility: HOSPITAL | Age: 63
Discharge: HOME | End: 2023-07-20
Attending: INTERNAL MEDICINE
Payer: COMMERCIAL

## 2023-07-20 LAB — INR BLDC: 3.6 (ref 0.9–1.1)

## 2023-07-20 PROCEDURE — 85610 PROTHROMBIN TIME: CPT | Performed by: INTERNAL MEDICINE

## 2023-08-22 ENCOUNTER — HOSPITAL ENCOUNTER (OUTPATIENT)
Dept: LAB | Facility: HOSPITAL | Age: 63
Discharge: HOME | End: 2023-08-22
Attending: INTERNAL MEDICINE
Payer: COMMERCIAL

## 2023-08-22 DIAGNOSIS — Z79.01 LONG TERM (CURRENT) USE OF ANTICOAGULANTS: Primary | ICD-10-CM

## 2023-08-22 LAB — INR BLDC: 2.1 (ref 0.9–1.1)

## 2023-08-22 PROCEDURE — 85610 PROTHROMBIN TIME: CPT | Performed by: INTERNAL MEDICINE

## 2023-10-07 NOTE — TELEPHONE ENCOUNTER
Requested Prescriptions     Pending Prescriptions Disp Refills    METFORMIN HCL 1000 MG Oral Tab [Pharmacy Med Name: METFORMIN 1000MG TABLETS] 90 tablet 1     Sig: TAKE 1 TABLET(1000 MG) BY MOUTH DAILY WITH BREAKFAST     LOV 4/5/2023     Patient was asked to follow-up in:     Appointment scheduled: Visit date not found     Medication refilled per protocol.

## 2023-10-16 ENCOUNTER — HOSPITAL ENCOUNTER (OUTPATIENT)
Dept: LAB | Facility: HOSPITAL | Age: 63
Discharge: HOME | End: 2023-10-16
Attending: INTERNAL MEDICINE
Payer: COMMERCIAL

## 2023-10-16 DIAGNOSIS — Z79.01 LONG TERM (CURRENT) USE OF ANTICOAGULANTS: ICD-10-CM

## 2023-10-16 LAB — INR BLDC: 2.5 (ref 0.9–1.1)

## 2023-10-16 PROCEDURE — 85610 PROTHROMBIN TIME: CPT

## 2023-12-01 ENCOUNTER — HOSPITAL ENCOUNTER (OUTPATIENT)
Dept: LAB | Facility: HOSPITAL | Age: 63
End: 2023-12-01
Attending: INTERNAL MEDICINE
Payer: COMMERCIAL

## 2023-12-01 ENCOUNTER — HOSPITAL ENCOUNTER (OUTPATIENT)
Dept: LAB | Facility: HOSPITAL | Age: 63
Discharge: HOME | End: 2023-12-01
Attending: INTERNAL MEDICINE
Payer: COMMERCIAL

## 2023-12-01 DIAGNOSIS — Z79.01 LONG TERM (CURRENT) USE OF ANTICOAGULANTS: ICD-10-CM

## 2023-12-01 LAB — INR BLDC: 3.5 (ref 0.9–1.1)

## 2023-12-01 PROCEDURE — 85610 PROTHROMBIN TIME: CPT

## 2023-12-19 ENCOUNTER — PATIENT MESSAGE (OUTPATIENT)
Dept: FAMILY MEDICINE CLINIC | Facility: CLINIC | Age: 63
End: 2023-12-19

## 2023-12-19 ENCOUNTER — TELEMEDICINE (OUTPATIENT)
Dept: FAMILY MEDICINE CLINIC | Facility: CLINIC | Age: 63
End: 2023-12-19
Payer: COMMERCIAL

## 2023-12-19 DIAGNOSIS — K52.9 GASTROENTERITIS: Primary | ICD-10-CM

## 2023-12-19 PROCEDURE — 99213 OFFICE O/P EST LOW 20 MIN: CPT | Performed by: FAMILY MEDICINE

## 2023-12-19 RX ORDER — ONDANSETRON 4 MG/1
4 TABLET, FILM COATED ORAL EVERY 8 HOURS PRN
Qty: 30 TABLET | Refills: 0 | Status: SHIPPED | OUTPATIENT
Start: 2023-12-19

## 2023-12-19 NOTE — PROGRESS NOTES
Chief Complaint   Patient presents with    Nausea    Diarrhea      Janis Boston is a 61year old male who presents via video encounter. HPI:   Issues since Saturday night. Heartburn. Decreased appetite. Only eaten 1/2 sandwich in the last 2 days. Diarrhea - water for the last several days. Occurring 6-10 times a day. No recent travel. No sick contacts. No sore throat. No fevers. REVIEW OF SYSTEMS:  Pertinent items are noted in HPI. Physical Exam:  alert, appears stated age, and cooperative, Speaking in full sentences comfortably, and Normal work of breathing        Assessment and Plan  Diagnoses and all orders for this visit:    Gastroenteritis  -     ondansetron (ZOFRAN) 4 mg tablet; Take 1 tablet (4 mg total) by mouth every 8 (eight) hours as needed for Nausea. Suspect viral GI bug.  Going around the community  Needs to stay hydrated  Simple to digest foods like crackers, saltines, bananas. Sometimes ginger ale and lemon lime type sodas can help settle the stomach  Several days in already. Should be getting near the end. Continue his efforts  Called in Zofran for as needed use for the nausea      This visit is conducted using Telemedicine with live, interactive video and audio. [place] in [place] at the time of the encounter.       Christian Patiño M.D.   [facility]  12/19/23

## 2023-12-19 NOTE — TELEPHONE ENCOUNTER
From: Hebert Valderrama  To: Saba Milner  Sent: 12/19/2023 8:45 AM CST  Subject: Friday video appointment    Good morning  I set up a video appointment but first availabe was Friday morning. In the last couple of days I have had stomach cramps, lots of diarrhea to the point its basically just water. Also, I've only eaten a 1/2 sandwich in the last couple days. If there is any chance of seeing you before Friday, please help. Also the constant fuzziness of the brain continues.     [facility]  [phone]  [email or web address]

## 2023-12-27 ENCOUNTER — HOSPITAL ENCOUNTER (OUTPATIENT)
Dept: LAB | Facility: HOSPITAL | Age: 63
Discharge: HOME | End: 2023-12-27
Attending: INTERNAL MEDICINE
Payer: COMMERCIAL

## 2023-12-27 DIAGNOSIS — Z79.01 LONG TERM (CURRENT) USE OF ANTICOAGULANTS: ICD-10-CM

## 2023-12-27 LAB — INR BLDC: 3.4 (ref 0.9–1.1)

## 2023-12-27 PROCEDURE — 85610 PROTHROMBIN TIME: CPT

## 2024-01-30 ENCOUNTER — HOSPITAL ENCOUNTER (OUTPATIENT)
Dept: LAB | Facility: HOSPITAL | Age: 64
Discharge: HOME | End: 2024-01-30
Attending: INTERNAL MEDICINE
Payer: COMMERCIAL

## 2024-01-30 DIAGNOSIS — Z79.01 LONG TERM (CURRENT) USE OF ANTICOAGULANTS: ICD-10-CM

## 2024-01-30 LAB — INR BLDC: 3.5 (ref 0.9–1.1)

## 2024-01-30 PROCEDURE — 85610 PROTHROMBIN TIME: CPT

## 2024-02-02 ENCOUNTER — LAB ENCOUNTER (OUTPATIENT)
Dept: LAB | Facility: HOSPITAL | Age: 64
End: 2024-02-02
Attending: INTERNAL MEDICINE
Payer: COMMERCIAL

## 2024-02-02 DIAGNOSIS — Z79.01 LONG TERM (CURRENT) USE OF ANTICOAGULANTS: ICD-10-CM

## 2024-02-02 DIAGNOSIS — E78.00 PURE HYPERCHOLESTEROLEMIA: ICD-10-CM

## 2024-02-02 DIAGNOSIS — E11.9 DIABETES MELLITUS (HCC): Primary | ICD-10-CM

## 2024-02-02 LAB
ALBUMIN SERPL-MCNC: 3.7 G/DL (ref 3.4–5)
ALBUMIN/GLOB SERPL: 1.2 {RATIO} (ref 1–2)
ALP LIVER SERPL-CCNC: 77 U/L
ALT SERPL-CCNC: 33 U/L
ANION GAP SERPL CALC-SCNC: 6 MMOL/L (ref 0–18)
AST SERPL-CCNC: 24 U/L (ref 15–37)
BILIRUB SERPL-MCNC: 0.9 MG/DL (ref 0.1–2)
BUN BLD-MCNC: 15 MG/DL (ref 9–23)
CALCIUM BLD-MCNC: 8.7 MG/DL (ref 8.5–10.1)
CHLORIDE SERPL-SCNC: 110 MMOL/L (ref 98–112)
CHOLEST SERPL-MCNC: 180 MG/DL (ref ?–200)
CO2 SERPL-SCNC: 25 MMOL/L (ref 21–32)
CREAT BLD-MCNC: 0.96 MG/DL
EGFRCR SERPLBLD CKD-EPI 2021: 89 ML/MIN/1.73M2 (ref 60–?)
EST. AVERAGE GLUCOSE BLD GHB EST-MCNC: 240 MG/DL (ref 68–126)
FASTING PATIENT LIPID ANSWER: YES
FASTING STATUS PATIENT QL REPORTED: YES
GLOBULIN PLAS-MCNC: 3 G/DL (ref 2.8–4.4)
GLUCOSE BLD-MCNC: 256 MG/DL (ref 70–99)
HBA1C MFR BLD: 10 % (ref ?–5.7)
HDLC SERPL-MCNC: 36 MG/DL (ref 40–59)
INR BLD: 2.75 (ref 0.8–1.2)
LDLC SERPL CALC-MCNC: 99 MG/DL (ref ?–100)
NONHDLC SERPL-MCNC: 144 MG/DL (ref ?–130)
OSMOLALITY SERPL CALC.SUM OF ELEC: 302 MOSM/KG (ref 275–295)
POTASSIUM SERPL-SCNC: 3.7 MMOL/L (ref 3.5–5.1)
PROT SERPL-MCNC: 6.7 G/DL (ref 6.4–8.2)
PROTHROMBIN TIME: 29.5 SECONDS (ref 11.6–14.8)
SODIUM SERPL-SCNC: 141 MMOL/L (ref 136–145)
TRIGL SERPL-MCNC: 262 MG/DL (ref 30–149)
VLDLC SERPL CALC-MCNC: 44 MG/DL (ref 0–30)

## 2024-02-02 PROCEDURE — 83036 HEMOGLOBIN GLYCOSYLATED A1C: CPT

## 2024-02-02 PROCEDURE — 36415 COLL VENOUS BLD VENIPUNCTURE: CPT

## 2024-02-02 PROCEDURE — 3046F HEMOGLOBIN A1C LEVEL >9.0%: CPT | Performed by: FAMILY MEDICINE

## 2024-02-02 PROCEDURE — 80053 COMPREHEN METABOLIC PANEL: CPT

## 2024-02-02 PROCEDURE — 85610 PROTHROMBIN TIME: CPT

## 2024-02-02 PROCEDURE — 80061 LIPID PANEL: CPT

## 2024-02-19 ENCOUNTER — OFFICE VISIT (OUTPATIENT)
Dept: FAMILY MEDICINE CLINIC | Facility: CLINIC | Age: 64
End: 2024-02-19
Payer: COMMERCIAL

## 2024-02-19 VITALS
DIASTOLIC BLOOD PRESSURE: 80 MMHG | SYSTOLIC BLOOD PRESSURE: 122 MMHG | BODY MASS INDEX: 31.22 KG/M2 | OXYGEN SATURATION: 99 % | HEIGHT: 71 IN | WEIGHT: 223 LBS | HEART RATE: 84 BPM | TEMPERATURE: 98 F

## 2024-02-19 DIAGNOSIS — E11.65 UNCONTROLLED TYPE 2 DIABETES MELLITUS WITH HYPERGLYCEMIA (HCC): Primary | ICD-10-CM

## 2024-02-19 DIAGNOSIS — R41.89 BRAIN FOG: ICD-10-CM

## 2024-02-19 DIAGNOSIS — R39.198 DECREASED URINE STREAM: ICD-10-CM

## 2024-02-19 DIAGNOSIS — I10 ESSENTIAL HYPERTENSION: ICD-10-CM

## 2024-02-19 PROBLEM — I95.9 HYPOTENSION: Status: RESOLVED | Noted: 2021-03-22 | Resolved: 2024-02-19

## 2024-02-19 PROCEDURE — 99214 OFFICE O/P EST MOD 30 MIN: CPT | Performed by: FAMILY MEDICINE

## 2024-02-19 PROCEDURE — 3074F SYST BP LT 130 MM HG: CPT | Performed by: FAMILY MEDICINE

## 2024-02-19 PROCEDURE — 3008F BODY MASS INDEX DOCD: CPT | Performed by: FAMILY MEDICINE

## 2024-02-19 PROCEDURE — 3079F DIAST BP 80-89 MM HG: CPT | Performed by: FAMILY MEDICINE

## 2024-02-19 NOTE — PROGRESS NOTES
Chief Complaint   Patient presents with    Flu     Discuss visit with Dr. Barros and labs.         HPI:   Garret Kraus is a 63 year old male with PMH a-fib, DM who presents to the office for catching up after recent cardio visit.    A1C increased from 6.9 to 10.0 over the last year.    Now taking 500mg metformin a day.  Good compliance.    Diet has been pretty consistent over the last year.    Eating out a little more.    Not as active as he should be.  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      Memory - similar.  Foggy headache a lot of the time.  On and off for quite a while.       - having frequency.  Slow not as great as it used to be.      REVIEW OF SYSTEMS:   Pertinent items are noted in HPI.  EXAM:   /80 (BP Location: Left arm, Patient Position: Sitting, Cuff Size: large)   Pulse 84   Temp 97.9 °F (36.6 °C)   Ht 5' 11\" (1.803 m)   Wt 223 lb (101.2 kg)   SpO2 99%   BMI 31.10 kg/m²     General appearance - alert, well appearing, and in no distress and overweight  Chest - clear to auscultation, no wheezes, rales or rhonchi, symmetric air entry  Heart - irreg irreg  Extremities - Bilateral barefoot skin diabetic exam is normal, visualized feet and the appearance is normal.  Bilateral monofilament/sensation of both feet is normal.  Pulsation pedal pulse exam of both lower legs/feet is normal as well.     ASSESSMENT AND PLAN:     aGrret Kraus was seen in the office today:  had concerns including Flu (Discuss visit with Dr. Barros and labs.   ).    1. Uncontrolled type 2 diabetes mellitus with hyperglycemia (HCC)  Sugars way up, unexpectedly  No significant changes since last spring when it was normal  Need to re-focus on healthy food choices  Get back into exercise  Raise metformin to 1000mg once a day again  Will recheck in three months.  If still high, consider Jardiance.     2. Essential hypertension  BP controlled today  Stable.     3. Brain fog  Still going  Present a year ago as well  May be  worse due to the elevated sugars.  Will get this under control and re-eval.     4. Decreased urine stream  Hesitant to add meds as last time caused hypotension and hospitalization  Hopeful that controlling the sugars will help some.  Will monitor         Kael Joshua M.D.   [facility]  02/19/24    Return in about 3 months (around 5/19/2024) for Diabetic follow-up.

## 2024-03-18 NOTE — TELEPHONE ENCOUNTER
Requested Prescriptions     Pending Prescriptions Disp Refills    METFORMIN HCL 1000 MG Oral Tab [Pharmacy Med Name: METFORMIN 1000MG TABLETS] 180 tablet 0     Sig: TAKE 1/2 TABLET(500MG) BY MOUTH DAILY WITH BREAKFAST     LOV 2/19/2024     Patient was asked to follow-up in: 3 months    Appointment scheduled: 5/20/2024 Kael Joshua MD     Medication refilled per protocol.

## 2024-03-19 RX ORDER — DIGOXIN 125 MCG
125 TABLET ORAL DAILY
Qty: 30 TABLET | Refills: 0 | Status: SHIPPED | OUTPATIENT
Start: 2024-03-19

## 2024-03-19 NOTE — TELEPHONE ENCOUNTER
Refill request for:    Requested Prescriptions     Pending Prescriptions Disp Refills    digoxin 0.125 MG Oral Tab  0     Sig: Take 1 tablet (125 mcg total) by mouth daily.        Last Prescribed Quantity Refills          LOV 2/19/2024     Patient was asked to follow-up in: 3 months    Appointment due: May 2024    Appointment scheduled: 5/20/2024 Kael Joshua MD    Medication not on protocol.     Routed to Kael Joshua MD for review

## 2024-03-19 NOTE — TELEPHONE ENCOUNTER
We can send into the pharmacy for a bit with him running so low, but he usually gets this managed by the cardio team, so they might want to manage the med going forward.

## 2024-03-22 ENCOUNTER — HOSPITAL ENCOUNTER (OUTPATIENT)
Dept: LAB | Facility: HOSPITAL | Age: 64
Discharge: HOME | End: 2024-03-22
Attending: INTERNAL MEDICINE
Payer: COMMERCIAL

## 2024-03-22 DIAGNOSIS — Z79.01 LONG TERM (CURRENT) USE OF ANTICOAGULANTS: ICD-10-CM

## 2024-03-22 LAB
INR BLD: 3.52 (ref 0.8–1.2)
INR BLDC: 4.8 (ref 0.9–1.1)
PROTHROMBIN TIME: 35.8 SECONDS (ref 11.6–14.8)

## 2024-03-22 PROCEDURE — 85610 PROTHROMBIN TIME: CPT

## 2024-03-22 PROCEDURE — 36415 COLL VENOUS BLD VENIPUNCTURE: CPT

## 2024-04-16 ENCOUNTER — TELEPHONE (OUTPATIENT)
Dept: FAMILY MEDICINE CLINIC | Facility: CLINIC | Age: 64
End: 2024-04-16

## 2024-04-16 DIAGNOSIS — E11.65 UNCONTROLLED TYPE 2 DIABETES MELLITUS WITH HYPERGLYCEMIA (HCC): Primary | ICD-10-CM

## 2024-04-16 DIAGNOSIS — Z00.00 LABORATORY EXAM ORDERED AS PART OF ROUTINE GENERAL MEDICAL EXAMINATION: ICD-10-CM

## 2024-04-16 DIAGNOSIS — Z12.5 SCREENING FOR MALIGNANT NEOPLASM OF PROSTATE: ICD-10-CM

## 2024-04-16 NOTE — TELEPHONE ENCOUNTER
Please enter lab orders for the patient's upcoming physical appointment.     Physical scheduled:   Your appointments       Date & Time Appointment Department ([place])    May 07, 2024 3:30 PM CDT Adult Physical with Kael Joshua MD [facility] ([facility])    PLEASE NOTE - Most insurances allow a Complete Physical once every 366 days. Please schedule accordingly.    Please arrive 15 minutes prior to your scheduled appointment. Please also bring your Insurance card, Photo ID, and your medication bottles or a list of your current medication.    If you no longer require this appointment, please contact your physician office to cancel.              [facility]  [address] [address]  [phone]           Preferred lab: [facility] ([facility])     The patient has been notified to complete fasting labs prior to their physical appointment.

## 2024-04-22 ENCOUNTER — HOSPITAL ENCOUNTER (OUTPATIENT)
Dept: LAB | Facility: HOSPITAL | Age: 64
Discharge: HOME | End: 2024-04-22
Attending: INTERNAL MEDICINE
Payer: COMMERCIAL

## 2024-04-22 DIAGNOSIS — Z79.01 LONG TERM (CURRENT) USE OF ANTICOAGULANTS: ICD-10-CM

## 2024-04-22 LAB — INR BLDC: 2 (ref 0.9–1.1)

## 2024-04-22 PROCEDURE — 85610 PROTHROMBIN TIME: CPT

## 2024-05-07 ENCOUNTER — OFFICE VISIT (OUTPATIENT)
Dept: FAMILY MEDICINE CLINIC | Facility: CLINIC | Age: 64
End: 2024-05-07
Payer: COMMERCIAL

## 2024-05-07 VITALS
HEART RATE: 86 BPM | DIASTOLIC BLOOD PRESSURE: 72 MMHG | TEMPERATURE: 97 F | HEIGHT: 71 IN | RESPIRATION RATE: 16 BRPM | SYSTOLIC BLOOD PRESSURE: 134 MMHG | WEIGHT: 213 LBS | BODY MASS INDEX: 29.82 KG/M2

## 2024-05-07 DIAGNOSIS — Z00.00 ANNUAL PHYSICAL EXAM: Primary | ICD-10-CM

## 2024-05-07 DIAGNOSIS — F33.0 MILD EPISODE OF RECURRENT MAJOR DEPRESSIVE DISORDER (HCC): ICD-10-CM

## 2024-05-07 DIAGNOSIS — I48.20 CHRONIC ATRIAL FIBRILLATION (HCC): ICD-10-CM

## 2024-05-07 DIAGNOSIS — E78.5 HYPERLIPIDEMIA LDL GOAL <100: ICD-10-CM

## 2024-05-07 DIAGNOSIS — E11.9 TYPE 2 DIABETES MELLITUS WITHOUT COMPLICATION, WITHOUT LONG-TERM CURRENT USE OF INSULIN (HCC): ICD-10-CM

## 2024-05-07 DIAGNOSIS — I10 ESSENTIAL HYPERTENSION: ICD-10-CM

## 2024-05-07 LAB — HEMOGLOBIN A1C: 7.3 % (ref 4.3–5.6)

## 2024-05-07 PROCEDURE — 83036 HEMOGLOBIN GLYCOSYLATED A1C: CPT | Performed by: FAMILY MEDICINE

## 2024-05-07 PROCEDURE — 3075F SYST BP GE 130 - 139MM HG: CPT | Performed by: FAMILY MEDICINE

## 2024-05-07 PROCEDURE — 3051F HG A1C>EQUAL 7.0%<8.0%: CPT | Performed by: FAMILY MEDICINE

## 2024-05-07 PROCEDURE — 3008F BODY MASS INDEX DOCD: CPT | Performed by: FAMILY MEDICINE

## 2024-05-07 PROCEDURE — 99396 PREV VISIT EST AGE 40-64: CPT | Performed by: FAMILY MEDICINE

## 2024-05-07 PROCEDURE — 3078F DIAST BP <80 MM HG: CPT | Performed by: FAMILY MEDICINE

## 2024-05-07 RX ORDER — ESCITALOPRAM OXALATE 5 MG/1
5 TABLET ORAL DAILY
Qty: 30 TABLET | Refills: 1 | Status: SHIPPED | OUTPATIENT
Start: 2024-05-07

## 2024-05-07 NOTE — PROGRESS NOTES
Chief Complaint   Patient presents with    Physical      HPI:   Garret Kraus is a 63 year old male who presents for a complete physical exam.     Last colonoscopy:  2/2022.  Repeat 10 yrs.    Last PSA:  ordered. ,.  Weak stream still.  The meds in the past caused BP dropping.    Immunizations: reviewed.  Only one shingles vaccine?      DM - A1C 7.3.  better.      Memory - still having memory fogs, hesitant when speaking.    Stresses high.  Wife with 2 lung masses, planning to undergo resection of these shortly.    He is in communication with therapists.      Heart - a-fib unless HR elevated.  On coreg, digoxin, warfarin.  Managed by cardio team.     Wt Readings from Last 6 Encounters:   05/07/24 213 lb (96.6 kg)   02/19/24 223 lb (101.2 kg)   04/05/23 231 lb (104.8 kg)   10/05/22 203 lb (92.1 kg)   03/29/22 215 lb (97.5 kg)   02/15/22 205 lb (93 kg)     Body mass index is 29.71 kg/m².     Chemistry Labs:   Lab Results   Component Value Date/Time     (H) 02/02/2024 06:20 AM     02/02/2024 06:20 AM    K 3.7 02/02/2024 06:20 AM     02/02/2024 06:20 AM    CO2 25.0 02/02/2024 06:20 AM    CREATSERUM 0.96 02/02/2024 06:20 AM    CA 8.7 02/02/2024 06:20 AM    ALB 3.7 02/02/2024 06:20 AM    TP 6.7 02/02/2024 06:20 AM    ALKPHO 77 02/02/2024 06:20 AM    AST 24 02/02/2024 06:20 AM    ALT 33 02/02/2024 06:20 AM    BILT 0.9 02/02/2024 06:20 AM          Cholesterol  (most recent labs)   Lab Results   Component Value Date/Time    CHOLEST 180 02/02/2024 06:20 AM    HDL 36 (L) 02/02/2024 06:20 AM    LDL 99 02/02/2024 06:20 AM    TRIG 262 (H) 02/02/2024 06:20 AM      No results found for: \"PSA\"      Current Outpatient Medications   Medication Sig Dispense Refill    digoxin 0.125 MG Oral Tab Take 1 tablet (125 mcg total) by mouth daily. 30 tablet 0    METFORMIN HCL 1000 MG Oral Tab TAKE 1/2 TABLET(500MG) BY MOUTH DAILY WITH BREAKFAST 180 tablet 0    ATORVASTATIN 40 MG Oral Tab TAKE 1 TABLET(40 MG) BY MOUTH DAILY  90 tablet 0    carvedilol 25 MG Oral Tab Take 1 tablet (25 mg total) by mouth 2 (two) times daily with meals.      Azelastine HCl 0.1 % Nasal Solution 1 spray by Nasal route 2 (two) times daily as needed for Rhinitis.      Warfarin Sodium 5 MG Oral Tab Take 1 tablet (5 mg total) by mouth. Takes 7.5mg daily Monday-Saturday, and 5mg on Sunday.      Albuterol Sulfate HFA (PROAIR HFA) 108 (90 Base) MCG/ACT Inhalation Aero Soln Inhale 2 puffs into the lungs every 4 (four) hours as needed for Wheezing. 1 Inhaler 2      Past Medical History:    Arrhythmia    Arthritis    Asthma (HCC)    Bad breath    Diabetes (HCC)    Diarrhea, unspecified    Disorder of prostate    Dizziness    Feeling lonely    Flatulence/gas pain/belching    Frequent urination    Headache disorder    Heart disease    Heart palpitations    High blood pressure    High cholesterol    History of depression    Indigestion    Itch of skin    Loss of appetite    Pain in joints    Painful urination    Sleep apnea    Uncomfortable fullness after meals    Visual impairment    readers    Weight loss      Past Surgical History:   Procedure Laterality Date    Colonoscopy      Colonoscopy N/A 2/15/2022    Procedure: COLONOSCOPY;  Surgeon: Issac Stanton MD;  Location:  ENDOSCOPY    Knee arthroscopy        Family History   Problem Relation Age of Onset    Cancer Mother         uterine    Uterine Cancer Mother     Diabetes Mother     Dementia Mother     Heart Attack Father     Hypertension Father     Diabetes Father     Diabetes Brother     Pulmonary Disease Maternal Grandmother         pneumonia    Heart Attack Maternal Grandmother     Heart Attack Maternal Grandfather     Heart Disease Paternal Grandmother     Heart Disease Paternal Grandfather       Social History:  Social History     Socioeconomic History    Marital status:    Tobacco Use    Smoking status: Never    Smokeless tobacco: Never    Tobacco comments:     1 cigar a year    Vaping Use     Vaping status: Never Used   Substance and Sexual Activity    Alcohol use: Yes     Alcohol/week: 2.0 standard drinks of alcohol     Types: 2 Cans of beer per week     Comment: weekly-monthly    Drug use: No   Other Topics Concern    Caffeine Concern Yes     Comment: 1 cup coffee daily M-F    Exercise Yes     Comment: golfs    Seat Belt Yes     Social Determinants of Health     Physical Activity: Inactive (4/17/2019)    [facility] from [email or web address]    Exercise Vital Sign     Days of Exercise per Week: 0 days     Minutes of Exercise per Session: 0 min      Occ: Yaa Castillo.   : yes - Raya. Children: yes.   Exercise: on and off.    Diet: better.      REVIEW OF SYSTEMS:     All systems reviewed, negative other than noted above.    EXAM:   /72   Pulse 86   Temp 97 °F (36.1 °C)   Resp 16   Ht 5' 11\" (1.803 m)   Wt 213 lb (96.6 kg)   BMI 29.71 kg/m²   Body mass index is 29.71 kg/m².     General appearance: alert, appears stated age and cooperative.    Eyes: conjunctivae/corneas clear. PERRL, EOM's intact.   Ears: normal TM's and external ear canals both ears  Neck: no adenopathy, no JVD, supple, symmetrical, trachea midline and thyroid not enlarged, symmetric, no tenderness/mass/nodules  Lungs: clear to auscultation bilaterally  Heart: S1, S2 normal, no murmur, click, rub or gallop, regular rate and rhythm  Abdomen: soft, non-tender; bowel sounds normal; no masses,  no organomegaly  Extremities: extremities normal, atraumatic, no cyanosis or edema  Pulses: 2+ and symmetric  Neurologic: Alert and oriented X 3, normal strength and tone. Normal symmetric reflexes. Normal coordination and gait     ASSESSMENT AND PLAN:     Garret Kraus was seen in the office today:  had concerns including Physical.    1. Annual physical exam  Overall fair  More sad of late.  Lots of stressors.  Will start treatment  Cscope UTD  Labs due.  A1C in office, so do not get this when getting the  other.s   Immunizations discussed.  Only one shingles vaccine?  May inquire at [employer].      2. Type 2 diabetes mellitus without complication, without long-term current use of insulin (HCC)  Better control  A1C 7.3  Continue better diet effort.  Working on getting more active  No changes to medications  - HEMOGLOBIN A1C    3. Mild episode of recurrent major depressive disorder (HCC)  Several ongoing stressors at this time.  Between health and family  Is in contact with a lot of therapist.  I recommend he consider pursuing formal counseling with somebody  In the meantime, interested in starting medicines to help with the moods overall  Discussed the side effects.  Will start Lexapro.  Recheck on med in 6 weeks   - escitalopram 5 MG Oral Tab; Take 1 tablet (5 mg total) by mouth daily.  Dispense: 30 tablet; Refill: 1    4. Chronic atrial fibrillation (HCC)  Irreg irreg today  Stable  Managed with cardio team  On AC - warfarin.     5. Essential hypertension  BP controlled  Stable meds.     6. Hyperlipidemia LDL goal <100  On statin  Lipids overdue.  Will check.         Return 6 weeks, for Mood check up.    Kael Joshua M.D.   EMG 3  05/07/24

## 2024-05-20 PROBLEM — R10.13 EPIGASTRIC PAIN: Status: RESOLVED | Noted: 2021-03-22 | Resolved: 2024-05-20

## 2024-05-20 PROBLEM — R79.89 AZOTEMIA: Status: RESOLVED | Noted: 2021-03-22 | Resolved: 2024-05-20

## 2024-05-20 PROBLEM — R55 SYNCOPE, NEAR: Status: RESOLVED | Noted: 2021-03-22 | Resolved: 2024-05-20

## 2024-05-29 ENCOUNTER — HOSPITAL ENCOUNTER (OUTPATIENT)
Dept: LAB | Facility: HOSPITAL | Age: 64
Discharge: HOME | End: 2024-05-29
Attending: INTERNAL MEDICINE

## 2024-05-29 DIAGNOSIS — Z79.01 LONG TERM (CURRENT) USE OF ANTICOAGULANTS: ICD-10-CM

## 2024-05-29 LAB — INR BLDC: 3.6 (ref 0.9–1.1)

## 2024-05-29 PROCEDURE — 85610 PROTHROMBIN TIME: CPT

## 2024-06-18 ENCOUNTER — OFFICE VISIT (OUTPATIENT)
Dept: FAMILY MEDICINE CLINIC | Facility: CLINIC | Age: 64
End: 2024-06-18

## 2024-06-18 VITALS
SYSTOLIC BLOOD PRESSURE: 110 MMHG | WEIGHT: 214 LBS | RESPIRATION RATE: 16 BRPM | HEART RATE: 72 BPM | OXYGEN SATURATION: 97 % | HEIGHT: 70 IN | BODY MASS INDEX: 30.64 KG/M2 | DIASTOLIC BLOOD PRESSURE: 80 MMHG

## 2024-06-18 DIAGNOSIS — M54.31 RIGHT SIDED SCIATICA: Primary | ICD-10-CM

## 2024-06-18 PROCEDURE — 3079F DIAST BP 80-89 MM HG: CPT | Performed by: FAMILY MEDICINE

## 2024-06-18 PROCEDURE — 99213 OFFICE O/P EST LOW 20 MIN: CPT | Performed by: FAMILY MEDICINE

## 2024-06-18 PROCEDURE — 3008F BODY MASS INDEX DOCD: CPT | Performed by: FAMILY MEDICINE

## 2024-06-18 PROCEDURE — 3074F SYST BP LT 130 MM HG: CPT | Performed by: FAMILY MEDICINE

## 2024-06-18 RX ORDER — PREDNISONE 20 MG/1
40 TABLET ORAL DAILY
Qty: 10 TABLET | Refills: 0 | Status: SHIPPED | OUTPATIENT
Start: 2024-06-18 | End: 2024-06-23

## 2024-06-18 NOTE — PROGRESS NOTES
Chief Complaint   Patient presents with    Depression     Patient here for depression follow up      HPI:   Garret Kraus is a 63 year old male who presents to the office for eval of low back pain.    Now pains in the R lower back, and radiating into the back of his leg.   Today is the first day its bothered him less.      Cannot recall any specific injury.   First noticed 3.5 weeks ago.      Limiting his activities since then.     REVIEW OF SYSTEMS:   Pertinent items are noted in HPI.  EXAM:   There were no vitals taken for this visit.    General appearance - alert, well appearing, and in no distress  Back exam - straight leg raise with low back pain on the R side bilaterally with legs up to 70 degrees but no shooting down leg.  Neurological - alert, oriented, normal speech, no focal findings or movement disorder noted, DTR's normal and symmetric, motor and sensory grossly normal bilaterally  Musculoskeletal - no joint tenderness, deformity or swelling  Extremities - peripheral pulses normal, no pedal edema, no clubbing or cyanosis  ASSESSMENT AND PLAN:     Garret Kraus was seen in the office today:  had concerns including Depression (Patient here for depression follow up).    1. Right sided sciatica  Suspect MSK sciatica  Heat, ice, rest now  Will add steroids.  Continue low back stretches  Should heal up on its own.  If persists, consider formal PT  - predniSONE 20 MG Oral Tab; Take 2 tablets (40 mg total) by mouth daily for 5 days.  Dispense: 10 tablet; Refill: 0        Kael Joshua M.D.   EMG 3  06/18/24

## 2024-06-22 DIAGNOSIS — M54.31 RIGHT SIDED SCIATICA: ICD-10-CM

## 2024-06-24 DIAGNOSIS — M54.31 RIGHT SIDED SCIATICA: ICD-10-CM

## 2024-06-24 RX ORDER — PREDNISONE 20 MG/1
40 TABLET ORAL DAILY
Qty: 10 TABLET | Refills: 0 | OUTPATIENT
Start: 2024-06-24

## 2024-06-24 NOTE — TELEPHONE ENCOUNTER
Refill request for:    Requested Prescriptions     Pending Prescriptions Disp Refills    PREDNISONE 20 MG Oral Tab [Pharmacy Med Name: PREDNISONE 20MG TABLETS] 10 tablet 0     Sig: TAKE 2 TABLETS(40 MG) BY MOUTH DAILY FOR 5 DAYS        Last Prescribed Quantity Refills   6/18/24 10 tablet 0     LOV 6/18/2024     Patient was asked to follow-up in: Follow-up not documented in note        Appointment scheduled: Visit date not found    Medication not on protocol.     Routed to Kael Joshua MD for review

## 2024-06-24 NOTE — TELEPHONE ENCOUNTER
We would normally just do a \"burst\" dose of steroids, and not repeat.  Too long can cause the adrenal glands to be an issue.

## 2024-07-09 ENCOUNTER — HOSPITAL ENCOUNTER (OUTPATIENT)
Dept: LAB | Facility: HOSPITAL | Age: 64
Discharge: HOME | End: 2024-07-09
Attending: INTERNAL MEDICINE
Payer: COMMERCIAL

## 2024-07-09 DIAGNOSIS — Z79.01 LONG TERM (CURRENT) USE OF ANTICOAGULANTS: ICD-10-CM

## 2024-07-09 LAB — INR BLDC: 2.4 (ref 0.9–1.1)

## 2024-07-09 PROCEDURE — 85610 PROTHROMBIN TIME: CPT

## 2024-07-10 DIAGNOSIS — F33.0 MILD EPISODE OF RECURRENT MAJOR DEPRESSIVE DISORDER (HCC): ICD-10-CM

## 2024-07-10 RX ORDER — ESCITALOPRAM OXALATE 5 MG/1
5 TABLET ORAL DAILY
Qty: 30 TABLET | Refills: 0 | OUTPATIENT
Start: 2024-07-10

## 2024-07-10 NOTE — TELEPHONE ENCOUNTER
Refill request for:    Requested Prescriptions     Pending Prescriptions Disp Refills    ESCITALOPRAM 5 MG Oral Tab [Pharmacy Med Name: ESCITALOPRAM 5MG TABLETS] 30 tablet 0     Sig: TAKE 1 TABLET(5 MG) BY MOUTH DAILY        LOV 6/18/2024     Rx sent in 5/20/24 for 90 days w/ 1 refill.              escitalopram 5 MG Oral Tab 90 tablet 1 5/20/2024 --    Sig - Route: Take 1 tablet (5 mg total) by mouth daily. - Oral    Sent to pharmacy as: Escitalopram Oxalate 5 MG Oral Tablet (Lexapro)    E-Prescribing Status: Receipt confirmed by pharmacy (5/20/2024  4:32 PM CDT)

## 2024-07-30 NOTE — TELEPHONE ENCOUNTER
Refill request for:    Requested Prescriptions     Pending Prescriptions Disp Refills    metFORMIN HCl 1000 MG Oral Tab 180 tablet 0        Last Prescribed Quantity Refills   03/18/24 180 0     LOV 6/18/2024     Patient was asked to follow-up in: Follow-up not documented in note    Appointment scheduled: Visit date not found    Medication not on protocol.     Patient Comment: apparently, I have been taking 1/2 tab twice a day and now I'm out. That's a long way from what I used to take. Is there any 500 mg tabs. The 1000 mg tabs are hard to break.       Routed to Kael Joshua MD for review

## 2024-10-22 RX ORDER — ATORVASTATIN CALCIUM 40 MG/1
40 TABLET, FILM COATED ORAL DAILY
Qty: 90 TABLET | Refills: 3 | Status: SHIPPED | OUTPATIENT
Start: 2024-10-22

## 2024-10-23 ENCOUNTER — HOSPITAL ENCOUNTER (OUTPATIENT)
Dept: LAB | Facility: HOSPITAL | Age: 64
Discharge: HOME | End: 2024-10-23
Attending: INTERNAL MEDICINE
Payer: COMMERCIAL

## 2024-10-23 DIAGNOSIS — I48.20 CHRONIC ATRIAL FIBRILLATION (HCC): ICD-10-CM

## 2024-10-23 LAB — INR BLDC: 3 (ref 0.9–1.1)

## 2024-10-23 PROCEDURE — 85610 PROTHROMBIN TIME: CPT | Performed by: INTERNAL MEDICINE

## 2024-11-05 ENCOUNTER — HOSPITAL ENCOUNTER (OUTPATIENT)
Dept: LAB | Facility: HOSPITAL | Age: 64
Discharge: HOME | End: 2024-11-05
Attending: INTERNAL MEDICINE
Payer: COMMERCIAL

## 2024-11-05 DIAGNOSIS — I48.20 CHRONIC ATRIAL FIBRILLATION (HCC): ICD-10-CM

## 2024-11-05 LAB — INR BLDC: 2.1 (ref 0.9–1.1)

## 2024-11-05 PROCEDURE — 85610 PROTHROMBIN TIME: CPT | Performed by: INTERNAL MEDICINE

## 2024-11-12 ENCOUNTER — PATIENT MESSAGE (OUTPATIENT)
Dept: FAMILY MEDICINE CLINIC | Facility: CLINIC | Age: 64
End: 2024-11-12

## 2024-11-25 ENCOUNTER — HOSPITAL ENCOUNTER (OUTPATIENT)
Dept: LAB | Facility: HOSPITAL | Age: 64
Discharge: HOME | End: 2024-11-25
Attending: INTERNAL MEDICINE
Payer: COMMERCIAL

## 2024-11-25 ENCOUNTER — LAB ENCOUNTER (OUTPATIENT)
Dept: LAB | Facility: HOSPITAL | Age: 64
End: 2024-11-25
Attending: INTERNAL MEDICINE
Payer: COMMERCIAL

## 2024-11-25 DIAGNOSIS — I48.20 CHRONIC ATRIAL FIBRILLATION (HCC): ICD-10-CM

## 2024-11-25 DIAGNOSIS — E11.65 UNCONTROLLED TYPE 2 DIABETES MELLITUS WITH HYPERGLYCEMIA (HCC): ICD-10-CM

## 2024-11-25 DIAGNOSIS — Z00.00 LABORATORY EXAM ORDERED AS PART OF ROUTINE GENERAL MEDICAL EXAMINATION: ICD-10-CM

## 2024-11-25 DIAGNOSIS — Z12.5 SCREENING FOR MALIGNANT NEOPLASM OF PROSTATE: ICD-10-CM

## 2024-11-25 LAB
ALBUMIN SERPL-MCNC: 4.1 G/DL (ref 3.2–4.8)
ALBUMIN/GLOB SERPL: 1.4 {RATIO} (ref 1–2)
ALP LIVER SERPL-CCNC: 78 U/L
ALT SERPL-CCNC: 20 U/L
ANION GAP SERPL CALC-SCNC: 7 MMOL/L (ref 0–18)
AST SERPL-CCNC: 21 U/L (ref ?–34)
BILIRUB SERPL-MCNC: 1.5 MG/DL (ref 0.2–1.1)
BUN BLD-MCNC: 14 MG/DL (ref 9–23)
CALCIUM BLD-MCNC: 9.2 MG/DL (ref 8.7–10.4)
CHLORIDE SERPL-SCNC: 102 MMOL/L (ref 98–112)
CO2 SERPL-SCNC: 25 MMOL/L (ref 21–32)
COMPLEXED PSA SERPL-MCNC: 1.57 NG/ML (ref ?–4)
CREAT BLD-MCNC: 1.11 MG/DL
CREAT UR-SCNC: 135.2 MG/DL
EGFRCR SERPLBLD CKD-EPI 2021: 74 ML/MIN/1.73M2 (ref 60–?)
EST. AVERAGE GLUCOSE BLD GHB EST-MCNC: 223 MG/DL (ref 68–126)
FASTING STATUS PATIENT QL REPORTED: NO
GLOBULIN PLAS-MCNC: 2.9 G/DL (ref 2–3.5)
GLUCOSE BLD-MCNC: 396 MG/DL (ref 70–99)
HBA1C MFR BLD: 9.4 % (ref ?–5.7)
INR BLDC: 2.2 (ref 0.9–1.1)
MICROALBUMIN UR-MCNC: 5.3 MG/DL
MICROALBUMIN/CREAT 24H UR-RTO: 39.2 UG/MG (ref ?–30)
OSMOLALITY SERPL CALC.SUM OF ELEC: 295 MOSM/KG (ref 275–295)
POTASSIUM SERPL-SCNC: 4.5 MMOL/L (ref 3.5–5.1)
PROT SERPL-MCNC: 7 G/DL (ref 5.7–8.2)
SODIUM SERPL-SCNC: 134 MMOL/L (ref 136–145)
TSI SER-ACNC: 1.35 UIU/ML (ref 0.55–4.78)

## 2024-11-25 PROCEDURE — 82570 ASSAY OF URINE CREATININE: CPT

## 2024-11-25 PROCEDURE — 84443 ASSAY THYROID STIM HORMONE: CPT

## 2024-11-25 PROCEDURE — 36415 COLL VENOUS BLD VENIPUNCTURE: CPT

## 2024-11-25 PROCEDURE — 83036 HEMOGLOBIN GLYCOSYLATED A1C: CPT

## 2024-11-25 PROCEDURE — 85610 PROTHROMBIN TIME: CPT | Performed by: INTERNAL MEDICINE

## 2024-11-25 PROCEDURE — 82043 UR ALBUMIN QUANTITATIVE: CPT

## 2024-11-25 PROCEDURE — 80053 COMPREHEN METABOLIC PANEL: CPT

## 2025-01-04 DIAGNOSIS — F33.0 MILD EPISODE OF RECURRENT MAJOR DEPRESSIVE DISORDER (HCC): ICD-10-CM

## 2025-01-04 RX ORDER — ESCITALOPRAM OXALATE 5 MG/1
5 TABLET ORAL DAILY
Qty: 90 TABLET | Refills: 0 | Status: SHIPPED | OUTPATIENT
Start: 2025-01-04

## 2025-01-04 NOTE — TELEPHONE ENCOUNTER
Front Office: Patient overdue for follow-up. Please schedule patient for OV before further refills will be given.    Requested Prescriptions     Signed Prescriptions Disp Refills    escitalopram 5 MG Oral Tab 90 tablet 0     Sig: TAKE 1 TABLET(5 MG) BY MOUTH DAILY     Authorizing Provider: TRAVIS GUILLAUME     Ordering User: JENIFER BUTT      Refilled per protocol/OV notes

## 2025-01-16 ENCOUNTER — HOSPITAL ENCOUNTER (OUTPATIENT)
Dept: LAB | Facility: HOSPITAL | Age: 65
Discharge: HOME | End: 2025-01-16
Attending: INTERNAL MEDICINE
Payer: COMMERCIAL

## 2025-01-16 DIAGNOSIS — I48.20 CHRONIC ATRIAL FIBRILLATION (HCC): ICD-10-CM

## 2025-01-16 LAB — INR BLDC: 2.2 (ref 0.9–1.1)

## 2025-01-16 PROCEDURE — 85610 PROTHROMBIN TIME: CPT | Performed by: INTERNAL MEDICINE

## 2025-02-13 ENCOUNTER — OFFICE VISIT (OUTPATIENT)
Dept: FAMILY MEDICINE CLINIC | Facility: CLINIC | Age: 65
End: 2025-02-13
Payer: COMMERCIAL

## 2025-02-13 VITALS
BODY MASS INDEX: 32.25 KG/M2 | WEIGHT: 225.25 LBS | SYSTOLIC BLOOD PRESSURE: 140 MMHG | DIASTOLIC BLOOD PRESSURE: 90 MMHG | HEIGHT: 70 IN | RESPIRATION RATE: 16 BRPM | HEART RATE: 67 BPM | OXYGEN SATURATION: 99 %

## 2025-02-13 DIAGNOSIS — F33.0 MILD EPISODE OF RECURRENT MAJOR DEPRESSIVE DISORDER: ICD-10-CM

## 2025-02-13 DIAGNOSIS — J01.00 ACUTE NON-RECURRENT MAXILLARY SINUSITIS: Primary | ICD-10-CM

## 2025-02-13 PROCEDURE — 3080F DIAST BP >= 90 MM HG: CPT | Performed by: FAMILY MEDICINE

## 2025-02-13 PROCEDURE — 3077F SYST BP >= 140 MM HG: CPT | Performed by: FAMILY MEDICINE

## 2025-02-13 PROCEDURE — 99213 OFFICE O/P EST LOW 20 MIN: CPT | Performed by: FAMILY MEDICINE

## 2025-02-13 PROCEDURE — 3008F BODY MASS INDEX DOCD: CPT | Performed by: FAMILY MEDICINE

## 2025-02-13 RX ORDER — ESCITALOPRAM OXALATE 10 MG/1
10 TABLET ORAL DAILY
Qty: 90 TABLET | Refills: 1 | Status: SHIPPED | OUTPATIENT
Start: 2025-02-13

## 2025-02-13 NOTE — PROGRESS NOTES
Chief Complaint   Patient presents with    Well Adult     Patient here for physical       HPI:   Garret Kraus is a 64 year old male with PMH diabetes, a-fib, depression who presents to the office with memory concerns.    We discussed the memory in summer 2024.  Described as memory fog.  Stresses at the time very high.  We have him on lexapro to help with the depression.    Memory not quite as bad, but still having the emotions.    The moods are pretty consistent.      Sinus pressure - congestion, colored mucus.  2 weeks.  Taking daytime cold and flu medicines.      REVIEW OF SYSTEMS:   Pertinent items are noted in HPI.  EXAM:   /90   Pulse 67   Resp 16   Ht 5' 10\" (1.778 m)   Wt 225 lb 4 oz (102.2 kg)   SpO2 99%   BMI 32.32 kg/m²     General appearance - alert, well appearing, and in no distress  Ears - bilateral TM's and external ear canals normal  Nose - sinuses normal and nontender  Mouth - mucous membranes moist, pharynx normal without lesions  Neck - supple, no significant adenopathy  Chest - clear to auscultation, no wheezes, rales or rhonchi, symmetric air entry  Heart - normal rate, regular rhythm, normal S1, S2, no murmurs, rubs, clicks or gallops  ASSESSMENT AND PLAN:     Garret Kraus was seen in the office today:  had concerns including Well Adult (Patient here for physical ).    1. Acute non-recurrent maxillary sinusitis  2 weeks of sinus pressure.  Suspect sinus infection  Start Augmentin - may need 10 days of antibiotics.  Continue OTC meds   - amoxicillin clavulanate 875-125 MG Oral Tab; Take 1 tablet by mouth 2 (two) times daily for 10 days.  Dispense: 20 tablet; Refill: 0    2. Mild episode of recurrent major depressive disorder  I suspect moods contributing to the memory, fatigue  Will raise dose from 5mg to 10mg  Add therapist.  [facility] referral  - escitalopram 10 MG Oral Tab; Take 1 tablet (10 mg total) by mouth daily.  Dispense: 90 tablet; Refill: 1  - OP REFERRAL TO [facility]  [facility]        Kael Joshua M.D.   EMG 3  02/13/25          Note to patient: The 21 Century Cures Act makes medical notes like these available to patients in the interest of transparency. However, be advised this is a medical document. It is intended as peer to peer communication. It is written in medical language and may contain abbreviations or verbiage that are unfamiliar. It may appear blunt or direct. Medical documents are intended to carry relevant information, facts as evident, and the clinical opinion of the practitioner.

## 2025-02-18 NOTE — TELEPHONE ENCOUNTER
Requested Prescriptions     Pending Prescriptions Disp Refills    metFORMIN HCl 1000 MG Oral Tab 180 tablet 1     Sig: Take 0.5 tablets (500 mg total) by mouth 2 (two) times daily with meals.     LOV 2/13/2025     Patient was asked to follow-up in: Follow-up not documented in note    Appointment scheduled: Visit date not found     Medication refilled per protocol.    Patient comment: Out.  Currently taking half tab every day.  Peeing 20 times a day.       Routed to Tres Scruggs MD, for review

## 2025-03-14 ENCOUNTER — HOSPITAL ENCOUNTER (OUTPATIENT)
Dept: LAB | Facility: HOSPITAL | Age: 65
Discharge: HOME | End: 2025-03-14
Attending: INTERNAL MEDICINE
Payer: COMMERCIAL

## 2025-03-14 DIAGNOSIS — I48.20 CHRONIC ATRIAL FIBRILLATION (HCC): ICD-10-CM

## 2025-03-14 LAB — INR BLDC: 2 (ref 0.9–1.1)

## 2025-03-14 PROCEDURE — 85610 PROTHROMBIN TIME: CPT | Performed by: INTERNAL MEDICINE

## 2025-04-03 DIAGNOSIS — F33.0 MILD EPISODE OF RECURRENT MAJOR DEPRESSIVE DISORDER: ICD-10-CM

## 2025-04-03 RX ORDER — ESCITALOPRAM OXALATE 5 MG/1
5 TABLET ORAL DAILY
Qty: 90 TABLET | Refills: 0 | OUTPATIENT
Start: 2025-04-03

## 2025-05-08 ENCOUNTER — PATIENT MESSAGE (OUTPATIENT)
Dept: FAMILY MEDICINE CLINIC | Facility: CLINIC | Age: 65
End: 2025-05-08

## 2025-05-08 RX ORDER — AZELASTINE 1 MG/ML
1 SPRAY, METERED NASAL 2 TIMES DAILY PRN
Qty: 30 ML | Refills: 3 | Status: SHIPPED | OUTPATIENT
Start: 2025-05-08

## 2025-05-08 RX ORDER — ATORVASTATIN CALCIUM 40 MG/1
40 TABLET, FILM COATED ORAL DAILY
Qty: 90 TABLET | Refills: 3 | Status: SHIPPED | OUTPATIENT
Start: 2025-05-08

## 2025-05-08 NOTE — TELEPHONE ENCOUNTER
Per Summer:   Finger tips ache  Headache  Swirly feelin iif that's a real thing   I have requested my metformin but [facility] is so slow.  3 days without my half tab  Sorry to be a pain    Metformin and atorvastatin don't meet protocol.     Requested Prescriptions     Pending Prescriptions Disp Refills    azelastine 0.1 % Nasal Solution  0     Si spray by Nasal route 2 (two) times daily as needed for Rhinitis.    atorvastatin 40 MG Oral Tab 90 tablet 3     Sig: Take 1 tablet (40 mg total) by mouth daily.    metFORMIN HCl 1000 MG Oral Tab 180 tablet 1     Sig: Take 0.5 tablets (500 mg total) by mouth 2 (two) times daily with meals.        Last refill: 10/22/24 2/19/25    Last Appointment: LOV 2025     Next Appointment: Visit date not found       
./Age: 85 years old or older

## 2025-05-15 DIAGNOSIS — F33.0 MILD EPISODE OF RECURRENT MAJOR DEPRESSIVE DISORDER: ICD-10-CM

## 2025-05-16 RX ORDER — ESCITALOPRAM OXALATE 10 MG/1
10 TABLET ORAL DAILY
Qty: 90 TABLET | Refills: 1 | Status: SHIPPED | OUTPATIENT
Start: 2025-05-16

## 2025-05-16 NOTE — TELEPHONE ENCOUNTER
Refill request for:    Requested Prescriptions     Pending Prescriptions Disp Refills    ESCITALOPRAM 10 MG Oral Tab [Pharmacy Med Name: ESCITALOPRAM 10MG TABLETS] 90 tablet 1     Sig: TAKE 1 TABLET(10 MG) BY MOUTH DAILY        Last Prescribed Quantity Refills   2/13/2025 90 1     LOV 2/13/2025     Appointment scheduled: Visit date not found    Medication not on protocol.     # 90 with 1 refills routed to Kael Joshua MD for review

## 2025-06-18 ENCOUNTER — PATIENT OUTREACH (OUTPATIENT)
Dept: FAMILY MEDICINE CLINIC | Facility: CLINIC | Age: 65
End: 2025-06-18

## 2025-08-07 DIAGNOSIS — F33.0 MILD EPISODE OF RECURRENT MAJOR DEPRESSIVE DISORDER: ICD-10-CM

## 2025-08-08 RX ORDER — ATORVASTATIN CALCIUM 40 MG/1
40 TABLET, FILM COATED ORAL DAILY
Qty: 90 TABLET | Refills: 3 | OUTPATIENT
Start: 2025-08-08

## 2025-08-08 RX ORDER — ESCITALOPRAM OXALATE 10 MG/1
10 TABLET ORAL DAILY
Qty: 90 TABLET | Refills: 1 | OUTPATIENT
Start: 2025-08-08

## 2025-08-08 RX ORDER — DIGOXIN 125 MCG
125 TABLET ORAL DAILY
Qty: 30 TABLET | Refills: 0 | OUTPATIENT
Start: 2025-08-08

## 2025-08-27 ENCOUNTER — HOSPITAL ENCOUNTER (OUTPATIENT)
Dept: LAB | Facility: HOSPITAL | Age: 65
Discharge: HOME | End: 2025-08-27
Attending: INTERNAL MEDICINE

## 2025-08-27 DIAGNOSIS — I48.20 CHRONIC ATRIAL FIBRILLATION (HCC): ICD-10-CM

## 2025-08-27 LAB — INR BLDC: 1.1 (ref 0.9–1.1)

## 2025-08-27 PROCEDURE — 85610 PROTHROMBIN TIME: CPT | Performed by: INTERNAL MEDICINE

## (undated) DIAGNOSIS — Z79.01 ANTICOAGULATED ON COUMADIN: Primary | ICD-10-CM

## (undated) DEVICE — 1200CC GUARDIAN II: Brand: GUARDIAN

## (undated) DEVICE — FILTERLINE NASAL ADULT O2/CO2

## (undated) DEVICE — Device: Brand: DEFENDO AIR/WATER/SUCTION AND BIOPSY VALVE

## (undated) DEVICE — ENDOSCOPY PACK - LOWER: Brand: MEDLINE INDUSTRIES, INC.

## (undated) DEVICE — 3M™ RED DOT™ MONITORING ELECTRODE WITH FOAM TAPE AND STICKY GEL, 50/BAG, 20/CASE, 72/PLT 2570: Brand: RED DOT™

## (undated) NOTE — MR AVS SNAPSHOT
[address] [facility] 70  [facility],  [address]  [address] [place] [address] [phone]               Thank you for choosing us for your health care visit with Zena Pang MD.  We are glad to serve you and happy to provide you with this s Assoc Dx:  Colon cancer screening [Z12.11]          Reason for Today's Visit     Physical           Medical Issues Discussed Today     Diabetes mellitus type II, uncontrolled    Annual physical exam    -  Primary    Colon cancer screening        Post-nasa These medications were sent to [facility] [address], [address] [facility], [phone], [phone]  [address] [address]    Hours:  24-hours Phone:  [phone]    - Fluticasone Furoate 27.5 MCG/

## (undated) NOTE — LETTER
ASTH[employer] PLAN for Ronak Sousa     : 1960     Date: 2019  Provider:  Kath Ariza MD  Phone for doctor or clinic: [facility], [address] [facility], [address] [address] [address] ,[address], [address]  [facility] [address] [place] [address]

## (undated) NOTE — MR AVS SNAPSHOT
[address] [facility] 70  [facility],  [address]  [address] [place] [address] [phone]               Thank you for choosing us for your health care visit with Catarina Bustos MD.  We are glad to serve you and happy to provide you with this s Commonly known as:  NORCO           lisinopril 20 MG Tabs   Commonly known as:  PRINIVIL,ZESTRIL           MetFORMIN HCl  MG Tb24   Take 1 tablet (500 mg total) by mouth 2 (two) times daily with meals.    Commonly known as:  GLUCOPHAGE-XR           *

## (undated) NOTE — ED AVS SNAPSHOT
[facility] Emergency Department    [place] [address] [address]    Phone:  [phone]    Fax:  [phone]           Mr. Brown Teodora   MRN: [id number]    Department:  [facility] Emergency Department   Date of Visit: Insurance plans vary and the physician(s) referred by the ER may not be covered by your plan. Please contact your insurance company to determine coverage for follow-up care and referrals.     [facility]  Main [phone]  Pediatric If the emergency physician has read X-rays, these will be re-interpreted by a radiologist.  If there is a significant change in your reading, you will be contacted. Please make sure we have your correct phone number before you leave.  After you leave, you s and ask to get set up for an insurance coverage that is in-network with [facility].         Imaging Results         XR RIBS WITH CHEST (3 VIEWS), LEFT  (CPT=71101) (Final result) Result time:  01/08/17 11:00:21    Final result    Impression: Dictated by: Rekha Lopez MD on 1/08/2017 at 9:56       Approved by: Rekha Lopez MD              Narrative:    PROCEDURE:  CT ABDOMEN+PELVIS KIDNEYSTONE 2D RNDR(NO IV,NO ORAL)(CPT=74176)     COMPARISON:  None.      INDICATIONS:  RIB PAIN, luq tra left iliac bone most likely a benign process. PELVIC ORGANS:  The bladder is nearly completely empty. No calcification noted. Mildly prominent prostate. LUNG BASES:  Normal.  No visible pulmonary or pleural disease. OTHER:  Negative.                [address]

## (undated) NOTE — MR AVS SNAPSHOT
[address] [facility] 70  [facility],  [address]  [address] [place] [address] [phone]               Thank you for choosing us for your health care visit with Byron Padilla MD.  We are glad to serve you and happy to provide you with this s Take 125 mcg by mouth daily. Commonly known as:  LANOXIN           furosemide 20 MG Tabs   Take 20 mg by mouth daily.    Commonly known as:  LASIX           guaiFENesin-codeine 100-10 MG/5ML Soln   Take 5 mL by mouth every 6 (six) hours as needed for coug

## (undated) NOTE — ED AVS SNAPSHOT
[facility] Emergency Department    [place] [address]    Phone:  [phone]    Fax:  [phone]           Mr. Bere Redd   MRN: [id number]    Department:  [facility] Emergency Department   Date of Visit: IF THERE IS ANY CHANGE OR WORSENING OF YOUR CONDITION, CALL YOUR PRIMARY CARE PHYSICIAN AT ONCE OR RETURN IMMEDIATELY TO THE EMERGENCY DEPARTMENT.     If you have been prescribed any medication(s), please fill your prescription right away and begin taking t

## (undated) NOTE — Clinical Note
[address] [facility], [address] [facility], [address] [place] [address] [address] ,[address], [place] [address] [address] [facility]        Kesha Martinez MD  • Jreo Morse MD • Hervey Collet, MD • DO Rosalino Saldana PA-C • NABEEL Leon

## (undated) NOTE — LETTER
[facility], [address] [place] [address] Chance Melendez [facility]  [phone]     Cat Ashraf,     This letter is to inform you that you are due for a diabetic eye exam. A referral has been placed for you to be

## (undated) NOTE — LETTER
Your patient was recently seen at the [facility] [place] for a hospital follow-up visit. The visit note is attached. Please contact the clinic with any questions at [phone].     Thank you,  KANDIS Morrell